# Patient Record
Sex: FEMALE | Race: WHITE | NOT HISPANIC OR LATINO | URBAN - METROPOLITAN AREA
[De-identification: names, ages, dates, MRNs, and addresses within clinical notes are randomized per-mention and may not be internally consistent; named-entity substitution may affect disease eponyms.]

---

## 2017-03-17 ENCOUNTER — HOSPITAL ENCOUNTER (OUTPATIENT)
Dept: RADIOLOGY | Facility: CLINIC | Age: 47
Discharge: HOME | End: 2017-03-17

## 2017-03-17 DIAGNOSIS — R76.11 POSITIVE PPD: ICD-10-CM

## 2020-03-01 ENCOUNTER — HEALTH MAINTENANCE LETTER (OUTPATIENT)
Age: 50
End: 2020-03-01

## 2020-12-14 ENCOUNTER — HEALTH MAINTENANCE LETTER (OUTPATIENT)
Age: 50
End: 2020-12-14

## 2021-04-18 ENCOUNTER — HEALTH MAINTENANCE LETTER (OUTPATIENT)
Age: 51
End: 2021-04-18

## 2021-05-24 ENCOUNTER — RECORDS - HEALTHEAST (OUTPATIENT)
Dept: ADMINISTRATIVE | Facility: CLINIC | Age: 51
End: 2021-05-24

## 2021-05-26 ENCOUNTER — RECORDS - HEALTHEAST (OUTPATIENT)
Dept: ADMINISTRATIVE | Facility: CLINIC | Age: 51
End: 2021-05-26

## 2021-05-27 ENCOUNTER — RECORDS - HEALTHEAST (OUTPATIENT)
Dept: ADMINISTRATIVE | Facility: CLINIC | Age: 51
End: 2021-05-27

## 2021-05-28 ENCOUNTER — RECORDS - HEALTHEAST (OUTPATIENT)
Dept: ADMINISTRATIVE | Facility: CLINIC | Age: 51
End: 2021-05-28

## 2021-05-29 ENCOUNTER — RECORDS - HEALTHEAST (OUTPATIENT)
Dept: ADMINISTRATIVE | Facility: CLINIC | Age: 51
End: 2021-05-29

## 2021-05-30 ENCOUNTER — RECORDS - HEALTHEAST (OUTPATIENT)
Dept: ADMINISTRATIVE | Facility: CLINIC | Age: 51
End: 2021-05-30

## 2021-05-31 ENCOUNTER — RECORDS - HEALTHEAST (OUTPATIENT)
Dept: ADMINISTRATIVE | Facility: CLINIC | Age: 51
End: 2021-05-31

## 2021-10-02 ENCOUNTER — HEALTH MAINTENANCE LETTER (OUTPATIENT)
Age: 51
End: 2021-10-02

## 2022-05-14 ENCOUNTER — HEALTH MAINTENANCE LETTER (OUTPATIENT)
Age: 52
End: 2022-05-14

## 2022-08-12 ENCOUNTER — OFFICE VISIT (OUTPATIENT)
Dept: SLEEP MEDICINE | Facility: CLINIC | Age: 52
End: 2022-08-12

## 2022-08-12 ENCOUNTER — LAB (OUTPATIENT)
Dept: LAB | Facility: CLINIC | Age: 52
End: 2022-08-12
Payer: COMMERCIAL

## 2022-08-12 VITALS
OXYGEN SATURATION: 98 % | WEIGHT: 137 LBS | DIASTOLIC BLOOD PRESSURE: 74 MMHG | SYSTOLIC BLOOD PRESSURE: 105 MMHG | HEART RATE: 93 BPM | HEIGHT: 67 IN | BODY MASS INDEX: 21.5 KG/M2

## 2022-08-12 DIAGNOSIS — G47.00 INSOMNIA, UNSPECIFIED TYPE: ICD-10-CM

## 2022-08-12 DIAGNOSIS — Z86.2 HISTORY OF ANEMIA: ICD-10-CM

## 2022-08-12 DIAGNOSIS — G25.81 RESTLESS LEGS SYNDROME (RLS): Primary | ICD-10-CM

## 2022-08-12 LAB
BASOPHILS # BLD AUTO: 0 10E3/UL (ref 0–0.2)
BASOPHILS NFR BLD AUTO: 0 %
EOSINOPHIL # BLD AUTO: 0.1 10E3/UL (ref 0–0.7)
EOSINOPHIL NFR BLD AUTO: 2 %
ERYTHROCYTE [DISTWIDTH] IN BLOOD BY AUTOMATED COUNT: 13.1 % (ref 10–15)
FERRITIN SERPL-MCNC: 53 NG/ML (ref 8–252)
HCT VFR BLD AUTO: 34.7 % (ref 35–47)
HGB BLD-MCNC: 12 G/DL (ref 11.7–15.7)
IMM GRANULOCYTES # BLD: 0 10E3/UL
IMM GRANULOCYTES NFR BLD: 0 %
IRON SATN MFR SERPL: 25 % (ref 15–46)
IRON SERPL-MCNC: 78 UG/DL (ref 35–180)
LYMPHOCYTES # BLD AUTO: 2.2 10E3/UL (ref 0.8–5.3)
LYMPHOCYTES NFR BLD AUTO: 37 %
MCH RBC QN AUTO: 32.2 PG (ref 26.5–33)
MCHC RBC AUTO-ENTMCNC: 34.6 G/DL (ref 31.5–36.5)
MCV RBC AUTO: 93 FL (ref 78–100)
MONOCYTES # BLD AUTO: 0.3 10E3/UL (ref 0–1.3)
MONOCYTES NFR BLD AUTO: 5 %
NEUTROPHILS # BLD AUTO: 3.3 10E3/UL (ref 1.6–8.3)
NEUTROPHILS NFR BLD AUTO: 56 %
NRBC # BLD AUTO: 0 10E3/UL
NRBC BLD AUTO-RTO: 0 /100
PLATELET # BLD AUTO: 220 10E3/UL (ref 150–450)
RBC # BLD AUTO: 3.73 10E6/UL (ref 3.8–5.2)
TIBC SERPL-MCNC: 312 UG/DL (ref 240–430)
WBC # BLD AUTO: 5.9 10E3/UL (ref 4–11)

## 2022-08-12 PROCEDURE — 36415 COLL VENOUS BLD VENIPUNCTURE: CPT

## 2022-08-12 PROCEDURE — 82728 ASSAY OF FERRITIN: CPT

## 2022-08-12 PROCEDURE — 83550 IRON BINDING TEST: CPT

## 2022-08-12 PROCEDURE — 85025 COMPLETE CBC W/AUTO DIFF WBC: CPT

## 2022-08-12 PROCEDURE — 99205 OFFICE O/P NEW HI 60 MIN: CPT | Performed by: INTERNAL MEDICINE

## 2022-08-12 RX ORDER — FOLIC ACID 1 MG/1
1000 TABLET ORAL DAILY
COMMUNITY
Start: 2022-08-06

## 2022-08-12 RX ORDER — METHOTREXATE 2.5 MG/1
TABLET ORAL
COMMUNITY
Start: 2022-08-07

## 2022-08-12 RX ORDER — LEVOTHYROXINE SODIUM 75 UG/1
75 TABLET ORAL DAILY
COMMUNITY
Start: 2022-06-06

## 2022-08-12 RX ORDER — CYCLOBENZAPRINE HCL 5 MG
TABLET ORAL
COMMUNITY
Start: 2022-08-01 | End: 2022-08-12

## 2022-08-12 RX ORDER — HYDROCODONE BITARTRATE AND ACETAMINOPHEN 10; 325 MG/1; MG/1
TABLET ORAL
COMMUNITY
Start: 2022-08-01 | End: 2024-04-15

## 2022-08-12 RX ORDER — GABAPENTIN 300 MG/1
300 CAPSULE ORAL AT BEDTIME
Qty: 45 CAPSULE | Refills: 0 | Status: SHIPPED | OUTPATIENT
Start: 2022-08-12 | End: 2022-09-15

## 2022-08-12 RX ORDER — TRAZODONE HYDROCHLORIDE 50 MG/1
TABLET, FILM COATED ORAL
COMMUNITY
Start: 2022-07-18 | End: 2022-08-12

## 2022-08-12 ASSESSMENT — SLEEP AND FATIGUE QUESTIONNAIRES
HOW LIKELY ARE YOU TO NOD OFF OR FALL ASLEEP WHILE SITTING AND TALKING TO SOMEONE: WOULD NEVER DOZE
HOW LIKELY ARE YOU TO NOD OFF OR FALL ASLEEP WHILE WATCHING TV: HIGH CHANCE OF DOZING
HOW LIKELY ARE YOU TO NOD OFF OR FALL ASLEEP WHILE SITTING QUIETLY AFTER LUNCH WITHOUT ALCOHOL: HIGH CHANCE OF DOZING
HOW LIKELY ARE YOU TO NOD OFF OR FALL ASLEEP WHILE SITTING QUIETLY AFTER LUNCH WITHOUT ALCOHOL: HIGH CHANCE OF DOZING
HOW LIKELY ARE YOU TO NOD OFF OR FALL ASLEEP WHILE SITTING INACTIVE IN A PUBLIC PLACE: SLIGHT CHANCE OF DOZING
HOW LIKELY ARE YOU TO NOD OFF OR FALL ASLEEP IN A CAR, WHILE STOPPED FOR A FEW MINUTES IN TRAFFIC: WOULD NEVER DOZE
HOW LIKELY ARE YOU TO NOD OFF OR FALL ASLEEP WHILE WATCHING TV: HIGH CHANCE OF DOZING
HOW LIKELY ARE YOU TO NOD OFF OR FALL ASLEEP WHILE LYING DOWN TO REST IN THE AFTERNOON WHEN CIRCUMSTANCES PERMIT: HIGH CHANCE OF DOZING
HOW LIKELY ARE YOU TO NOD OFF OR FALL ASLEEP WHILE SITTING AND READING: MODERATE CHANCE OF DOZING
HOW LIKELY ARE YOU TO NOD OFF OR FALL ASLEEP WHILE SITTING INACTIVE IN A PUBLIC PLACE: SLIGHT CHANCE OF DOZING
HOW LIKELY ARE YOU TO NOD OFF OR FALL ASLEEP WHEN YOU ARE A PASSENGER IN A CAR FOR AN HOUR WITHOUT A BREAK: MODERATE CHANCE OF DOZING
HOW LIKELY ARE YOU TO NOD OFF OR FALL ASLEEP WHILE SITTING AND TALKING TO SOMEONE: WOULD NEVER DOZE
HOW LIKELY ARE YOU TO NOD OFF OR FALL ASLEEP WHEN YOU ARE A PASSENGER IN A CAR FOR AN HOUR WITHOUT A BREAK: MODERATE CHANCE OF DOZING
HOW LIKELY ARE YOU TO NOD OFF OR FALL ASLEEP WHILE SITTING AND READING: MODERATE CHANCE OF DOZING
HOW LIKELY ARE YOU TO NOD OFF OR FALL ASLEEP IN A CAR, WHILE STOPPED FOR A FEW MINUTES IN TRAFFIC: WOULD NEVER DOZE
HOW LIKELY ARE YOU TO NOD OFF OR FALL ASLEEP WHILE LYING DOWN TO REST IN THE AFTERNOON WHEN CIRCUMSTANCES PERMIT: HIGH CHANCE OF DOZING

## 2022-08-12 NOTE — PROGRESS NOTES
Chief complaint: Patient is self-referred for difficulty with sleep; needs Vicodin for sleep      History of Present Illness: 51-year-old female with history of psoriatic arthritis.  She reports she has been sleeping reasonably well up until the last few years.  She has gone through menopause and had significant flare of her psoriatic arthritis as well as Lyme disease.  She had developed significant difficulties with her sleep during this period of time.  She was also prescribed Vicodin for pain which was helpful for sleep.  Her arthritis seems to be under much better control at this time on methotrexate and infliximab.  However she feels she continues to have difficulty with sleep.  She usually starts her bedtime routine around 830 or so getting into bed after 9.  She will take the Vicodin as she is getting ready for bed.  She will read in bed for a little bit and then with the vitamins able to fall asleep.  She often wakes up around 3 AM with difficulty returning to sleep.  She admits to feeling of restlessness at that time.  She also has had restlessness when she tries to cut back on the Vicodin.  Her  comes into the bedroom around 10 30-11 PM.  He has not noticed significant snoring or observed apneas.  She typically gets up around 5:45 in the morning to help gets her kids off to school.  On weekends she will sleep until 730 or 8 AM .  She typically drinks a couple coffee in the morning otherwise no more caffeine.    She looks forward to taking a nap most days of the week.  She sets an alarm for about an hour to an hour and a half.  She usually takes a nap in the early afternoon around  1-3 PM.    There is no history of nightmares, sleepwalking, sleep talking or dream enactment behavior.    She has tried various other substances to help her sleep including herbal remedies for sleep and restlessness that include melatonin, valerian, tryptophan.  She is also tried diphenhydramine.       Reviewing the chart  she has had history of anemia associated with surgeries as well as 4 pregnancies.  She is not currently taking iron.  She does take vitamin D with calcium.    Anita Sleepiness Scale   Sitting and reading: Moderate chance of dozing   Watching TV: High chance of dozing   Sitting, inactive in a public place (e.g. a theatre or a meeting): Slight chance of dozing   As a passenger in a car for an hour without a break: Moderate chance of dozing   Lying down to rest in the afternoon when circumstances permit: High chance of dozing   Sitting and talking to someone: Would never doze   Sitting quietly after a lunch without alcohol: High chance of dozing   In a car, while stopped for a few minutes in traffic: Would never doze   Total score - Anita: 14   (Less than 10 normal)    Insomnia Severity Scale  BARRY Total Score: 23  Total score categories:  0-7 = No clinically significant insomnia   8-14 = Subthreshold insomnia   15-21 = Clinical insomnia (moderate severity)  22-28 = Clinical insomnia (severe)    STOP-BANG  Loud Snore   0  Excessively Tired/Sleepy   1  Observed apnea   0  Hypertension   0  BMI> 35 kg/m2   0  Age >50   1  Neck >16 in/40cm   0  Male Gender   0  Total =   2  (0-2 low, 3-4 intermediate, 5-8 high risk of RICHIE)      Past Medical History:   Diagnosis Date     Psoriatic arthritis (H)        No Known Allergies    Current Outpatient Medications   Medication     Calcium Citrate-Vitamin D 1500-200 MG-UNIT TABS     folic acid (FOLVITE) 1 MG tablet     gabapentin (NEURONTIN) 300 MG capsule     HYDROcodone-acetaminophen (NORCO)  MG per tablet     levothyroxine (SYNTHROID/LEVOTHROID) 75 MCG tablet     methotrexate sodium 2.5 MG TABS     No current facility-administered medications for this visit.       Social History     Socioeconomic History     Marital status:      Spouse name: Not on file     Number of children: Not on file     Years of education: Not on file     Highest education level: Not on file  "  Occupational History     Not on file   Tobacco Use     Smoking status: Never Smoker     Smokeless tobacco: Never Used   Substance and Sexual Activity     Alcohol use: Yes     Alcohol/week: 7.0 standard drinks     Types: 7 Glasses of wine per week     Drug use: Not Currently     Sexual activity: Not on file   Other Topics Concern     Not on file   Social History Narrative     Not on file     Social Determinants of Health     Financial Resource Strain: Not on file   Food Insecurity: Not on file   Transportation Needs: Not on file   Physical Activity: Not on file   Stress: Not on file   Social Connections: Not on file   Intimate Partner Violence: Not on file   Housing Stability: Not on file       Family History   Problem Relation Age of Onset     Substance Abuse Maternal Half-Brother          EXAM:  /74   Pulse 93   Ht 1.702 m (5' 7\")   Wt 62.1 kg (137 lb)   SpO2 98%   BMI 21.46 kg/m   Neck 37 cm  GENERAL: Alert and no distress  EYES: Eyes grossly normal to inspection.  No discharge or erythema, or obvious scleral/conjunctival abnormalities.  Oral exam: Low drooping soft palate Mallampati 2 tonsillar stage I  RESP: No audible wheeze, cough, or visible cyanosis.  No visible retractions or increased work of breathing.  Lungs are clear to auscultation bilaterally  Cardiac tones are regular rate and rhythm  SKIN: Visible skin clear. No significant rash, abnormal pigmentation or lesions.  NEURO: Cranial nerves grossly intact.  Mentation and speech appropriate for age.  PSYCH: Mentation appears normal, affect normal/bright, judgement and insight intact, normal speech and appearance well-groomed.   Extre: No cyanosis clubbing or edema she does have deviation at the DIP joints and swollen joints hand.      TSH   Date Value Ref Range Status   10/03/2011 1.24 0.4 - 5.0 mU/L Final         ASSESSMENT:  51-year-old female with psoriatic arthritis history of Lyme disease, postmenopausal who developed significant issues " with insomnia related to pain.  Insomnia issues continue despite pain being under better control with treatment disease modifying agents.  There seems to be a component of motor restlessness that could be contributing.  She is low risk for obstructive sleep apnea.  She is a good candidate for CBT-I.    PLAN:  Sleep study is not indicated.  Recommend evaluation of anemia history and iron stores.  If ferritin and iron stores suggest low or low normal storage recommend iron with vitamin C daily.  Also recommended a trial of gabapentin 300 mg at bedtime.  Patient should cut back on the Vicodin slowly while she is taking the gabapentin.  She can go up to 600 mg of gabapentin when she is off the Vicodin if needed.  I sent her a message that she will receive in a few weeks to follow-up how medication is doing so I can rewrite the prescription.  Otherwise she will follow-up with me in 3 months.  Also referred her for formal cognitive behavioral therapy for insomnia.  Patient was instructed on the importance of taking detailed sleep logs.  She is strongly urged to cut back her daily naps to 20 to 30 minutes or less.      61 minutes spent on the date of the encounter doing chart review, history and exam, documentation and further activities per the note    Nahomy Randhawa M.D.  Pulmonary/Critical Care/Sleep Medicine    [facility]   Floor [address], [address]   [address] [place] [address]. [place] [address]   Appointments: [phone]    The above note was dictated using voice recognition software and may include typographical errors. Please contact the author for any clarifications.

## 2022-08-12 NOTE — PATIENT INSTRUCTIONS
Insomnia - Restless Leg Syndrome (RLS)  Based on the information that you provided today you are likely to have restless leg syndrome that may be interfering with your sleep.  Treatment of restless leg syndrome usually depends on how much it is bothering you.  If it is a major problem then you might want to do something about it.  Here are some treatment options that you might want to consider:       Behavior Changes:     - Reduce or eliminate caffeine and alcohol products     - Increase the amount of stretching that you do, particularly before bedtime     - Sometimes a leg message can be helpful     - Some people find that a warm bath or shower in the evening is helpful       Medications:     - Pramipexole (Mirapex)     - Ropinirole (Requip)     - Sinemet (Carbidopa / Levodopa)     - Neurontin     Possibly iron with vitamin C     Make your daytime nap shorter--set alarm for 20-30 minutes    Insomnia and Behavioral Sleep Medicine Program    [facility] provides non-drug treatment for sleep problems including:    Cognitive-behavioral Therapies for Insomnia (CBT-I)  Management of Shift-work and Jet Lag  Management of Delayed, Advanced and Irregular Circadian Rhythm Sleep Disorders  Imagery Rehearsal Therapy (IRT) for Nightmare Disorder  PAP Therapy Desensitization    You have been referred for consultation with a sleep psychologist who specializes in behavioral sleep medicine and treatment of insomnia.  [facility] offers individualized telehealth services through our [facility] and online CBT-I.    Preparing for your Consultation    You will need to keep a Sleep Diary for at least a week prior to your visit. Complete the sleep diary each day first thing after you get up by answering a few key questions about your sleep using our convenient mobile boone or paper sleep diary.  Your  answers should be based on your recall of the past 24 hours.  Avoid watching the clock or recording data during the night.     Insomnia  Daisy    The Insomnia  mobile daisy  is a convenient way to keep track of your sleep prior to your sleep consultation.  Simply download the free daisy on your Apple or Android phone and record your information each morning.  The daisy includes training, self-assessment, and sleep schedule recommendations.  Prior to your consultation we recommend you use only the sleep diary function. You can e-mail yourself a copy of your sleep diary data by going to the Settings section and using the [employer] User Data function.  During your consultation your provider will review the data with you.          Zerto Sleep Diary    You can also track your sleep using the Zerto paper sleep diary.  You can upload your sleep diary and send it via a [facility]TS message, fax it to [phone], or have it with you at the time of your consultation.            CBT-I:  Frequently Asked Questions    What is CBT-I?    Cognitive Behavioral Therapy for Insomnia, also known as CBT-I, is a highly effective non-drug treatment for insomnia. The American College of Physicians recommends CBT-I as the first treatment for chronic insomnia.  Research has shown CBT-I to be safer and more effective long term than sleeping pills.    What does CBT-I involve?     CBT-I targets behaviors that lead to chronic insomnia:  Habits that weaken the bed as a cue for sleep  Habits that weaken your body's sleep drive and sleep/wake clock   Unhelpful sleep thoughts that increase sleep-related worry and arousal.    The process involves 3-6 telehealth visits that guide you to implement proven strategies to get a better night's sleep.    People often see improvement in their sleep within a few weeks. Research shows if you keep practicing the skills you learn your sleep is likely to continue to improve 6-12 months after  treatment.    Does this program prescribe or manage sleep medication?    No.  Your prescribing provider is responsible to assist you in managing your sleep medications.  Some people choose to stop using sleep medication prior to or during CBT-I.  Our program can work with your prescribing provider to help reduce or eliminate use of sleep medications.     Getting Started Today!    If you haven't already done so, we recommend you consider making the following changes to your sleep habits prior to your sleep consultation:     Reduce your consumption of caffeine and alcohol.  Both can disrupt sleep and make strengthening your sleep more difficult.  Specifically:    - Avoid caffeine within 6 hours of bedtime   - No more than 3 caffeinated beverages per day (e.g. 8 oz. cup coffee or 12 oz. cup soda)            - No alcohol within 3 hours of bedtime    Make sure your bedroom is quiet, comfortable and dark.  Noise, light and an uncomfortable sleep space can harm your sleep.      Keep the same sleep schedule 7 days a week.unless you do shift work.      Online CBT-I     If you want to get started today, research indicates that online CBT-I can be effective for some individuals. These programs requires comfort with boone-based or online learning.  However, digital CBT-I programs are not for everyone.  Contraindications include:    Seizure disorders,   Bipolar disorder,   Unstable medical or mental health conditions,   Frailty or risk of falling  Pregnancy    You should consult a sleep specialist before using these resources if you have:    Sleep Apnea  Restless Leg Syndrome  Sleep Walking  REM behavior disorder  Night Terrors  Excessive Daytime Sleepiness  Are engaged in shift work  Use prescription sleep medication    Our Online CBT-I program    If your sleep provider recommends online CBT-I for you , the cost for an entire 6-week program is $40.    To get started, copy and paste the link below which will take you to the  landing page to register:                           [email or web address]               If you wish to complete the online CBT-I program but do not plan to follow-up with a sleep provider, you are set to begin the program.    If you are planning to work with an [facility] sleep provider, there are a couple of extra steps you can take to share your sleep data with your sleep provider.  To share sleep log data, go to the left side navigation and click on the  share sleep log  button:         You will be taken to the page below where you will enter  the provider code [employer] into the box.          Once you press the locate button, the information for  [place] will pop up as below.  By pressing the Submit button your data will be sent to our  secure [facility] sleep program portal for review by your sleep provider. You will only need to do this step once.                                  Self-help Workbooks for Insomnia    If you have found self-help books useful in the past, you may want to consider reading one of the following books prior to your consultation:    Say Delia to Insomnia: The Six-Week, Drug-Free Program Developed at [facility] [employer].  Jaspreet Ryan MD. Available in paperback, Zbigniew, and audiobook.    Overcoming Insomnia: A Cognitive-Behavioral Therapy Approach, Workbook.  Aren Morgan, PhD  and Danisha Gold, PhD.  Available in paperback and Zbigniew.    Quiet Your Mind and Get to Sleep: Solutions to Insomnia for Those with Depression, Anxiety, or Chronic Pain.  Ness Dobbins, PhD and Danisha Gold, PhD.  Available in paperback and Zbigniew         Your BMI is Body mass index is 21.46 kg/m .    What is BMI?  Body mass index (BMI) is one way to tell whether you are at a healthy weight, overweight, or obese. It measures your weight in relation to your height.  A BMI of 18.5 to 24.9 is in the healthy range. A person with a BMI of 25 to 29.9 is considered overweight, and  someone with a BMI of 30 or greater is considered obese.  Another way to find out if you are at risk for health problems caused by overweight and obesity is to measure your waist. If you are a woman and your waist is more than 35 inches, or if you are a man and your waist is more than 40 inches, your risk of disease may be higher.  More than two-thirds of American adults are considered overweight or obese. Being overweight or obese increases the risk for further weight gain.  Excess weight may lead to heart disease and diabetes. Creating and following plans for healthy eating and physical activity may help you improve your health.    Methods for maintaining or losing weight.  Weight control is part of healthy lifestyle and includes exercise, emotional health, and healthy eating habits.  Careful eating habits lifelong is the mainstay of weight control.  Though there are significant health benefits from weight loss, long-term weight loss with diet alone may be very difficult to achieve- studies show long-term success with dietary management in less than 10% of people. Attaining a healthy weight may be especially difficult to achieve in those with severe obesity. In some cases, medications, devices and surgical management might be considered.    What can you do?  If you are overweight or obese and are interested in methods for weight loss, you should discuss this with your provider. In addition, we recommend that you review healthy life styles and methods for weight loss available through the National Institutes of Health patient information sites:   http://win.niddk.nih.gov/publications/index.htm

## 2022-08-12 NOTE — NURSING NOTE
CBTI scheduled along w. Sleep diary info sent via [employer]. 3 month f.u scheduled.    August Suggs, Visit facilitator

## 2022-09-03 ENCOUNTER — HEALTH MAINTENANCE LETTER (OUTPATIENT)
Age: 52
End: 2022-09-03

## 2022-09-15 ENCOUNTER — TELEPHONE (OUTPATIENT)
Dept: SLEEP MEDICINE | Facility: CLINIC | Age: 52
End: 2022-09-15

## 2022-09-15 RX ORDER — GABAPENTIN 300 MG/1
CAPSULE ORAL
Qty: 120 CAPSULE | Refills: 1 | Status: SHIPPED | OUTPATIENT
Start: 2022-09-15 | End: 2024-02-14

## 2022-09-15 NOTE — TELEPHONE ENCOUNTER
Patient was called and notified of the following recommendation from Dr. Randhawa.     Wrote prescription to increase to 3 capsules at bedtime, after 2 weeks can increase to 4.  Will change the size of the capsules once dosing stabilizes.   She needs to keep her appointment with Dr. Aguilar.  Encouraged keeping detailed diary in the weeks prior.   KARIN Watson RN on 9/15/2022 at 1:58 PM

## 2022-09-15 NOTE — TELEPHONE ENCOUNTER
Patient called back letting us know she is taking 600mg without relief. Patient reports some improvement with RLS but is having trouble sleeping.     Patient reports averaging 3-4 hours of sleep per night, awakening intermittently during that time.     Due for Gabapentin refill. Unsure if different medication should be prescribed or not.   Last filled: 08/13/2022 quantity 45 with 0 refills    Will route to provider to advise and fill Gabapentin or move forward with alternate medication.     Susana Watson RN on 9/15/2022 at 11:50 AM

## 2022-09-15 NOTE — TELEPHONE ENCOUNTER
Message from MA was sent yesterday evening regarding medications questions the patient had.     Call placed to patient to discuss. No answer. Detailed message left for patient to call back as able.     Susana Watson RN on 9/15/2022 at 7:57 AM

## 2022-09-21 ASSESSMENT — SLEEP AND FATIGUE QUESTIONNAIRES
HOW LIKELY ARE YOU TO NOD OFF OR FALL ASLEEP WHILE SITTING AND READING: MODERATE CHANCE OF DOZING
HOW LIKELY ARE YOU TO NOD OFF OR FALL ASLEEP WHILE SITTING INACTIVE IN A PUBLIC PLACE: SLIGHT CHANCE OF DOZING
HOW LIKELY ARE YOU TO NOD OFF OR FALL ASLEEP WHILE SITTING QUIETLY AFTER LUNCH WITHOUT ALCOHOL: MODERATE CHANCE OF DOZING
HOW LIKELY ARE YOU TO NOD OFF OR FALL ASLEEP WHILE SITTING AND TALKING TO SOMEONE: SLIGHT CHANCE OF DOZING
HOW LIKELY ARE YOU TO NOD OFF OR FALL ASLEEP WHEN YOU ARE A PASSENGER IN A CAR FOR AN HOUR WITHOUT A BREAK: SLIGHT CHANCE OF DOZING
HOW LIKELY ARE YOU TO NOD OFF OR FALL ASLEEP WHILE LYING DOWN TO REST IN THE AFTERNOON WHEN CIRCUMSTANCES PERMIT: MODERATE CHANCE OF DOZING
HOW LIKELY ARE YOU TO NOD OFF OR FALL ASLEEP IN A CAR, WHILE STOPPED FOR A FEW MINUTES IN TRAFFIC: WOULD NEVER DOZE
HOW LIKELY ARE YOU TO NOD OFF OR FALL ASLEEP WHILE WATCHING TV: MODERATE CHANCE OF DOZING

## 2022-09-22 ENCOUNTER — OFFICE VISIT (OUTPATIENT)
Dept: SLEEP MEDICINE | Facility: CLINIC | Age: 52
End: 2022-09-22
Payer: COMMERCIAL

## 2022-09-22 VITALS
SYSTOLIC BLOOD PRESSURE: 118 MMHG | BODY MASS INDEX: 22.98 KG/M2 | HEART RATE: 98 BPM | DIASTOLIC BLOOD PRESSURE: 79 MMHG | HEIGHT: 66 IN | OXYGEN SATURATION: 99 % | WEIGHT: 143 LBS

## 2022-09-22 DIAGNOSIS — G25.81 RESTLESS LEGS SYNDROME (RLS): ICD-10-CM

## 2022-09-22 DIAGNOSIS — G47.00 INSOMNIA, UNSPECIFIED TYPE: ICD-10-CM

## 2022-09-22 DIAGNOSIS — G47.10 HYPERSOMNIA: Primary | ICD-10-CM

## 2022-09-22 DIAGNOSIS — Z98.890 HISTORY OF MANDIBULAR SURGERY: ICD-10-CM

## 2022-09-22 PROCEDURE — 99215 OFFICE O/P EST HI 40 MIN: CPT | Performed by: INTERNAL MEDICINE

## 2022-09-22 RX ORDER — CYCLOBENZAPRINE HCL 5 MG
TABLET ORAL
COMMUNITY
Start: 2022-09-20 | End: 2024-02-14

## 2022-09-22 RX ORDER — PROGESTERONE 100 MG/1
100 CAPSULE ORAL AT BEDTIME
COMMUNITY
Start: 2022-09-04 | End: 2024-02-14

## 2022-09-22 NOTE — PROGRESS NOTES
Chief complaint: Follow-up for sleep, daytime sleepiness    History of Present Illness: 52-year-old female with history of severe psoriatic arthritis and pain.  Pain was significantly affecting her sleep, she was prescribed Vicodin which was helpful for pain and sleep.  Once she started immunotherapy pain improved. However, sleep issues persisted. She continues to feel that she cannot sleep well without Vicodin.  Please see her initial consult note for details.  The sleep issues appear to be consistent with a motor restlessness.  They impact her mostly around 1-3 AM when she is trying to fall back asleep.  She usually falls asleep after 9 PM.  Currently she is taking gabapentin with recent increase to 900 mg at bedtime.  She feels that it does help her fall asleep however she continues to wake up and have difficulty returning to sleep with a sense of restlessness.  She does feel drowsy however at that time.  She continues to get up in the morning to get her kids off to school.  She is not currently taking iron therapy.  There is been no history of snoring or observed apneas.  However patient does have a history of mandible surgery and has a slightly recessed jaw.  She wears sleep tape over her mouth to keep her mouth closed and keep her mouth from getting overly dry.  She does take diphenhydramine sometimes in the middle the night to help her fall back asleep.  She is not on any antidepressants.  She does have an appointment scheduled with the insomnia team in a couple of months.      Magnolia Sleepiness Scale  Total score - Magnolia: 11 (9/21/2022  9:58 PM)   (Less than 10 normal)    Insomnia Severity Scale  BARRY Total Score: 19  (normal 0-7, mild 8-14, moderate 15-21, severe 22-28)    Past Medical History:   Diagnosis Date     Psoriatic arthritis (H)        No Known Allergies    Current Outpatient Medications   Medication     Calcium Citrate-Vitamin D 1500-200 MG-UNIT TABS     folic acid (FOLVITE) 1 MG tablet      gabapentin (NEURONTIN) 300 MG capsule     HYDROcodone-acetaminophen (NORCO)  MG per tablet     levothyroxine (SYNTHROID/LEVOTHROID) 75 MCG tablet     methotrexate sodium 2.5 MG TABS     No current facility-administered medications for this visit.       Social History     Socioeconomic History     Marital status:      Spouse name: Not on file     Number of children: Not on file     Years of education: Not on file     Highest education level: Not on file   Occupational History     Not on file   Tobacco Use     Smoking status: Never Smoker     Smokeless tobacco: Never Used   Substance and Sexual Activity     Alcohol use: Yes     Alcohol/week: 7.0 standard drinks     Types: 7 Glasses of wine per week     Drug use: Not Currently     Sexual activity: Not on file   Other Topics Concern     Not on file   Social History Narrative     Not on file     Social Determinants of Health     Financial Resource Strain: Not on file   Food Insecurity: Not on file   Transportation Needs: Not on file   Physical Activity: Not on file   Stress: Not on file   Social Connections: Not on file   Intimate Partner Violence: Not on file   Housing Stability: Not on file       Family History   Problem Relation Age of Onset     Substance Abuse Maternal Half-Brother            EXAM:  There were no vitals taken for this visit.  GENERAL: Alert   EYES: Eyes grossly normal to inspection.  No discharge or erythema, or obvious scleral/conjunctival abnormalities.  Slight overjet with recessed lower mandible  RESP: No audible wheeze, cough, or visible cyanosis.  No visible retractions or increased work of breathing.    SKIN: Visible skin clear. No significant rash, abnormal pigmentation or lesions.  NEURO: Cranial nerves grossly intact.  Mentation and speech appropriate for age.  PSYCH: Mentation appears normal, becomes tearful when discussing her sleep frustrations, judgement and insight intact, normal speech     Ferritin 53 (goal  >75)    ASSESSMENT:  52-year-old female with severe psoriatic arthritis, improved pain on immune therapy, feels she cannot sleep without opiates.  Opiates may be treating a component of motor restlessness that persists despite improved disease control with immunotherapy.  She has some psychophysiologic insomnia associated with her history.  Getting some benefit potentially with gabapentin.  But continues to suffer in the middle of the night.    PLAN:  Given that ferritin is below 75 recommended iron and vitamin C therapy every other day.  We will split gabapentin dosing to 600/600 to optimize absorption as well as optimize timing for symptoms that are worse in the middle the night.  She will take 600 at bedtime and 600 few hours later.  Sometimes patients need dosing as high as 1800 and higher.  Could consider switching to gabapentin encarbil for its delayed peak onset but does not appear to be covered by her insurance.  Alternatively in the future could consider using as needed Mirapex in the middle of the night..  Avoid any medications that could be aggravating motor restlessness.  Patient does take diphenhydramine on occasion.  She should discontinue that.  And will rule out other sleep disorders beginning with sleep disordered breathing even though she is low risk.  Ordered home sleep apnea test.    41 minutes spent on the date of the encounter doing chart review, history and exam, documentation and further activities per the note    Nahomy Randhawa M.D.  Pulmonary/Critical Care/Sleep Medicine    [facility]   Floor 1, [address]   [address] [place] [address]. [place] [address]   Appointments: [phone]    The above note was dictated using voice recognition software and may include typographical errors. Please contact the author for any clarifications.

## 2022-09-22 NOTE — NURSING NOTE
"  HST  Instructions:    [facility]   [address] [facility] [address]  [facility], [address]     PLEASE NOTE: WE ARE UNABLE TO DO REDUCED PARKING AT THIS TIME FOR RED RAMP. THERE IS STREET PARKING ACROSS FROM RAMP     Information:     *Park in the Red Ramp- This visit will be 15 minutes or less    *You will  then enter the building via the [address] and come down to 1st floor, where the sleep lab is    located in [address] (if you do not enter via [address] you will need to walk to front of building as all other doors are locked).     *Please ring door bell and our sleep technicians will answer and instruct you on placement of the Home Sleep Study device.    Drop Off Information:    Please arrive in the front of the \"[facility]\" and call [phone] when returning your Home Sleep Study device and our technicians will come out to your car to .                                        "

## 2022-09-22 NOTE — PATIENT INSTRUCTIONS
Take oral iron with vitamin C every other day  Take 600 mg of gabapentin at bedtime then take another 600 mg 2-4 hour later if awaken with restlessness  Stop taking diphenhydramine (benadryl)    Your BMI is Body mass index is 23.08 kg/m .    What is BMI?  Body mass index (BMI) is one way to tell whether you are at a healthy weight, overweight, or obese. It measures your weight in relation to your height.  A BMI of 18.5 to 24.9 is in the healthy range. A person with a BMI of 25 to 29.9 is considered overweight, and someone with a BMI of 30 or greater is considered obese.  Another way to find out if you are at risk for health problems caused by overweight and obesity is to measure your waist. If you are a woman and your waist is more than 35 inches, or if you are a man and your waist is more than 40 inches, your risk of disease may be higher.  More than two-thirds of American adults are considered overweight or obese. Being overweight or obese increases the risk for further weight gain.  Excess weight may lead to heart disease and diabetes. Creating and following plans for healthy eating and physical activity may help you improve your health.    Methods for maintaining or losing weight.  Weight control is part of healthy lifestyle and includes exercise, emotional health, and healthy eating habits.  Careful eating habits lifelong is the mainstay of weight control.  Though there are significant health benefits from weight loss, long-term weight loss with diet alone may be very difficult to achieve- studies show long-term success with dietary management in less than 10% of people. Attaining a healthy weight may be especially difficult to achieve in those with severe obesity. In some cases, medications, devices and surgical management might be considered.    What can you do?  If you are overweight or obese and are interested in methods for weight loss, you should discuss this with your provider. In addition, we recommend  that you review healthy life styles and methods for weight loss available through the National Institutes of Health patient information sites:   http://win.niddk.nih.gov/publications/index.htm

## 2022-11-07 RX ORDER — GABAPENTIN 300 MG/1
CAPSULE ORAL
Qty: 120 CAPSULE | Refills: 1 | Status: CANCELLED | OUTPATIENT
Start: 2022-11-07

## 2022-11-07 NOTE — TELEPHONE ENCOUNTER
Pending Prescriptions:                       Disp   Refills    gabapentin (NEURONTIN) 300 MG capsule     120 ca*1            Sig: Three capsules at bedtime. After two weeks can           increased to 4.    Last Written Prescription Date:  9/15/2022  Last Fill Quantity: 120,   # refills: 1  Last Office Visit with G, P or [facility] prescribing provider: 9/22/2022  Future Office visit:   Pt has cancelled HST, CBTI, and follow up with Dr. Randhawa.      VERENICE Landry

## 2022-11-14 NOTE — TELEPHONE ENCOUNTER
Called and left message for patient to call back and clarify if she is taking 900mg or 1200mg at bedtime.     Alina Reilly CMA on 11/14/2022 at 3:23 PM

## 2023-06-03 ENCOUNTER — HEALTH MAINTENANCE LETTER (OUTPATIENT)
Age: 53
End: 2023-06-03

## 2023-09-18 ENCOUNTER — APPOINTMENT (OUTPATIENT)
Dept: MRI IMAGING | Facility: HOSPITAL | Age: 53
End: 2023-09-18
Payer: COMMERCIAL

## 2023-09-18 ENCOUNTER — HOSPITAL ENCOUNTER (EMERGENCY)
Facility: HOSPITAL | Age: 53
Discharge: HOME | End: 2023-09-18
Payer: COMMERCIAL

## 2023-09-18 VITALS
WEIGHT: 132 LBS | SYSTOLIC BLOOD PRESSURE: 135 MMHG | RESPIRATION RATE: 18 BRPM | BODY MASS INDEX: 20.72 KG/M2 | DIASTOLIC BLOOD PRESSURE: 81 MMHG | OXYGEN SATURATION: 100 % | HEART RATE: 89 BPM | TEMPERATURE: 98.2 F | HEIGHT: 67 IN

## 2023-09-18 DIAGNOSIS — G44.019 EPISODIC CLUSTER HEADACHE, NOT INTRACTABLE: ICD-10-CM

## 2023-09-18 LAB
ANION GAP SERPL CALCULATED.3IONS-SCNC: 11 MMOL/L (ref 7–15)
BASOPHILS # BLD AUTO: 0 10E3/UL (ref 0–0.2)
BASOPHILS NFR BLD AUTO: 1 %
BUN SERPL-MCNC: 14 MG/DL (ref 6–20)
CALCIUM SERPL-MCNC: 9.4 MG/DL (ref 8.6–10)
CHLORIDE SERPL-SCNC: 105 MMOL/L (ref 98–107)
CREAT SERPL-MCNC: 0.73 MG/DL (ref 0.51–0.95)
DEPRECATED HCO3 PLAS-SCNC: 24 MMOL/L (ref 22–29)
EGFRCR SERPLBLD CKD-EPI 2021: >90 ML/MIN/1.73M2
EOSINOPHIL # BLD AUTO: 0.1 10E3/UL (ref 0–0.7)
EOSINOPHIL NFR BLD AUTO: 1 %
ERYTHROCYTE [DISTWIDTH] IN BLOOD BY AUTOMATED COUNT: 12.6 % (ref 10–15)
GLUCOSE SERPL-MCNC: 95 MG/DL (ref 70–99)
HCT VFR BLD AUTO: 38.8 % (ref 35–47)
HGB BLD-MCNC: 13.4 G/DL (ref 11.7–15.7)
HOLD SPECIMEN: NORMAL
IMM GRANULOCYTES # BLD: 0 10E3/UL
IMM GRANULOCYTES NFR BLD: 0 %
LYMPHOCYTES # BLD AUTO: 2 10E3/UL (ref 0.8–5.3)
LYMPHOCYTES NFR BLD AUTO: 35 %
MCH RBC QN AUTO: 32.4 PG (ref 26.5–33)
MCHC RBC AUTO-ENTMCNC: 34.5 G/DL (ref 31.5–36.5)
MCV RBC AUTO: 94 FL (ref 78–100)
MONOCYTES # BLD AUTO: 0.3 10E3/UL (ref 0–1.3)
MONOCYTES NFR BLD AUTO: 5 %
NEUTROPHILS # BLD AUTO: 3.3 10E3/UL (ref 1.6–8.3)
NEUTROPHILS NFR BLD AUTO: 58 %
NRBC # BLD AUTO: 0 10E3/UL
NRBC BLD AUTO-RTO: 0 /100
PLATELET # BLD AUTO: 249 10E3/UL (ref 150–450)
POTASSIUM SERPL-SCNC: 4.4 MMOL/L (ref 3.4–5.3)
RBC # BLD AUTO: 4.13 10E6/UL (ref 3.8–5.2)
SODIUM SERPL-SCNC: 140 MMOL/L (ref 136–145)
WBC # BLD AUTO: 5.7 10E3/UL (ref 4–11)

## 2023-09-18 PROCEDURE — 255N000002 HC RX 255 OP 636: Mod: JZ

## 2023-09-18 PROCEDURE — A9585 GADOBUTROL INJECTION: HCPCS | Mod: JZ

## 2023-09-18 PROCEDURE — 36415 COLL VENOUS BLD VENIPUNCTURE: CPT

## 2023-09-18 PROCEDURE — 70553 MRI BRAIN STEM W/O & W/DYE: CPT

## 2023-09-18 PROCEDURE — 99285 EMERGENCY DEPT VISIT HI MDM: CPT | Mod: 25

## 2023-09-18 PROCEDURE — 85025 COMPLETE CBC W/AUTO DIFF WBC: CPT

## 2023-09-18 PROCEDURE — 82310 ASSAY OF CALCIUM: CPT

## 2023-09-18 RX ORDER — TETRACAINE HYDROCHLORIDE 5 MG/ML
1 SOLUTION OPHTHALMIC ONCE
Status: COMPLETED | OUTPATIENT
Start: 2023-09-18 | End: 2023-09-18

## 2023-09-18 RX ORDER — GADOBUTROL 604.72 MG/ML
6 INJECTION INTRAVENOUS ONCE
Status: COMPLETED | OUTPATIENT
Start: 2023-09-18 | End: 2023-09-18

## 2023-09-18 RX ADMIN — GADOBUTROL 6 ML: 604.72 INJECTION INTRAVENOUS at 13:38

## 2023-09-18 ASSESSMENT — ENCOUNTER SYMPTOMS
PHOTOPHOBIA: 0
CHILLS: 0
SEIZURES: 0
FEVER: 0
EYE PAIN: 1
SORE THROAT: 0
CONFUSION: 0
HEADACHES: 1
SHORTNESS OF BREATH: 0
NUMBNESS: 0
WEAKNESS: 0
EYE ITCHING: 0
EYE REDNESS: 1
RHINORRHEA: 1
EYE DISCHARGE: 0
SPEECH DIFFICULTY: 0
COUGH: 0

## 2023-09-18 ASSESSMENT — VISUAL ACUITY
OS: 20/70
OU: 20/50
OD: 20/70
OU: NORMAL

## 2023-09-18 ASSESSMENT — ACTIVITIES OF DAILY LIVING (ADL)
ADLS_ACUITY_SCORE: 33
ADLS_ACUITY_SCORE: 35
ADLS_ACUITY_SCORE: 35

## 2023-09-18 NOTE — DISCHARGE INSTRUCTIONS
You were seen at the Emergency Department today for eye pain/tearing and headache.  You had a thorough workup that included a physical exam and a MRI Head.    Your symptoms sound consistent with cluster headache as discussed. I recommend rest, staying well hydrated, ibuprofen, Tylenol or Excedrin for recurrent symptoms, cool or warm compresses (whichever feel better to you).     If your symptoms are difficult to control despite these above recommendations, you can follow up with your primary care provider to discuss prophylactic medications.    Ibuprofen/Naproxen Discharge Instructions:  You may take ibuprofen for pain control.  The maximum dose of (ibuprofen is 3200 mg ) in a 24-hour period.    Take this medication with food to prevent stomach irritation.  With long-term use this medication can irritate the stomach causing pain and lead to development of a stomach ulcer.  If you notice stomach pain or vomiting of coffee-ground colored vomit or blood, please be seen by a healthcare provider.  Attempt to use this medication for the shortest time possible.      Tylenol (Acetaminophen) Discharge Instructions:  You may take 2 tablets of regular strength, over-the-counter, Tylenol (acetaminophen) every 4-6 hours as needed for pain.  Take no more than 4000 mg of Tylenol in a 24-hour period.      Avoid taking more than 1 acetaminophen-containing product at a time and be aware that many over-the-counter medications contain a combination of acetaminophen and other products.  If you are taking Tylenol in addition to a combination product please keep track of your daily acetaminophen dose to make sure you do not exceed the recommended 4000 mg.  Taking too much acetaminophen can cause permanent damage to your liver.    Please follow up with your primary care provider for reevaluation and ongoing management.    Return to the ER if you develop any worsening headache, confusion, vomiting, dizziness, weakness/numbness/tingling of  arms or legs, facial droop, speech difficulty, or other new symptoms.    Take Care!  - Zulay Barlow PA-C

## 2023-09-18 NOTE — ED PROVIDER NOTES
"EMERGENCY DEPARTMENT ENCOUNTER      NAME: Ivette Carmona  AGE: 53 year old female  YOB: 1970  MRN: [id number]  EVALUATION DATE & TIME: 2023 10:57 AM    PCP: Luz Montanez    ED PROVIDER: Zulay Barlow PA-C      Chief Complaint   Patient presents with    Eye Problem         FINAL IMPRESSION:  1. Episodic cluster headache, not intractable          ED COURSE & MEDICAL DECISION MAKIN:29 AM Met with patient for initial interview. Plan for care discussed.  2:45 PM Reviewed MRI results with ED MD who recommends further consultation with neurology. Neurology paged.  2:55 PM Spoke with neurology who advises MRI results is something patient was likely born with. He reports symptomatic management for headaches.  3:00 PM Reevaluated and updated patient. I discussed the plan for discharge with the patient, and patient is agreeable. We discussed supportive cares at home and reasons for return to the ER including new or worsening symptoms. All questions and concerns addressed. Patient to be discharged by RN.    53 year old female presents to the Emergency Department for evaluation of episodic right eye pain with redness, tearing, rhinorrhea, and headache. Symptoms have since resolved upon my initial evaluation with resolution of headache, eye pain/redness/tearing, and rhinorrhea. No preceding trauma or injury. Upon exam, patient is afebrile, mildly hypertensive, and in no acute distress. Patient answers questions appropriately and exhibits no acute focal neurological deficits. Symptoms sound like classic cluster headache, but given she has been seen \"many times\" by PCP without previous imaging of the head, will obtain MRI to rule out new mass/lesion/MS. Based on patient's presenting symptoms, laboratories and imaging were ordered. Given resolution of symptoms and no preceding trauma, using shared decision making Wood's lamp deferred.    CBC without leukocytosis or anemia. BMP WNL. MRI without " acute infarct, mass, or hemorrhage, but did show slight focal dilation of right lateral ventricle which could reflex remote injury vs developmental variation. Discussed findings with neurology who advises this is likely developmental variation and no additional imaging or work-up is recommended. He recommends headache management and outpatient follow up with PCP.     Patient declined analgesia upon initial evaluation as symptoms resolved. Symptoms and workup most consistent with cluster headache. Patient was made aware of the above findings. Plan to discharge patient home with symptomatic management, strict return precautions, and close follow up with their PCP for reevaluation and ongoing management. The patient was stable and well appearing upon discharge. The patient was advised to return to the ER if any new or worsening symptoms develop. The patient verbalizes understanding and agrees with the plan.     Medical Decision Making    History:  Supplemental history from: Documented in chart, if applicable  External Record(s) reviewed: Documented in chart, if applicable.    Work Up:  Chart documentation includes differential considered and any EKGs or imaging independently interpreted by provider, where specified.  In additional to work up documented, I considered the following work up: Documented in chart, if applicable.    External consultation:  Discussion of management with another provider: Documented in chart, if applicable    Complicating factors:  Care impacted by chronic illness: N/A  Care affected by social determinants of health: N/A    Disposition considerations: Discharge. No recommendations on prescription strength medication(s). See documentation for any additional details.        MEDICATIONS GIVEN IN THE EMERGENCY:  Medications   tetracaine (PONTOCAINE) 0.5 % ophthalmic solution 1 drop (1 drop Both Eyes Not Given 9/18/23 1424)   fluorescein (FUL-CHRISTIANO) ophthalmic strip 1 strip (1 strip Both Eyes Not  Given 9/18/23 2404)   gadobutrol (GADAVIST) injection 6 mL (6 mLs Intravenous $Given 9/18/23 133)       NEW PRESCRIPTIONS STARTED AT TODAY'S ER VISIT  New Prescriptions    No medications on file          =================================================================    HPI    Patient information was obtained from: patient    Use of : N/A      Ivette Carmona is a 53 year old female who presents to this ED for evaluation of left eye pain and redness.  Patient reports episodic right eye pain, redness, tearing, runny nose, headache that lasts approximately 2 hours.  She experiences these episodes about 3-4 times per week.  She reports symptoms have been ongoing for the last 6 months.  She uses a heating pad over the eye with some relief.  She has tried artificial tears without improvement.  She has seen her primary care provider multiple times without improvement.  She never followed up with ophthalmology as recommended.  She denies any fevers, chills, eye crusting/purulent drainage, itching, vision changes or peripheral vision changes.  She wears glasses, but does not wear contacts.  She denies any ocular injuries.  She denies any new numbness, tingling, weakness in her arms or legs, speech difficulty, gait trouble, chest pain, shortness of breath, or any other concerning symptoms.  She denies history of  MS or other neuromuscular degenerative diseases.  She reports an episode prior to arrival, but symptoms have since completely resolved.      REVIEW OF SYSTEMS   Review of Systems   Constitutional:  Negative for chills and fever.   HENT:  Positive for rhinorrhea. Negative for congestion and sore throat.    Eyes:  Positive for pain and redness. Negative for photophobia, discharge, itching and visual disturbance.   Respiratory:  Negative for cough and shortness of breath.    Cardiovascular:  Negative for chest pain.   Neurological:  Positive for headaches. Negative for seizures, syncope, speech difficulty,  "weakness and numbness.   Psychiatric/Behavioral:  Negative for confusion.      PAST MEDICAL HISTORY:  Past Medical History:   Diagnosis Date    Psoriatic arthritis (H)        PAST SURGICAL HISTORY:  No past surgical history on file.        CURRENT MEDICATIONS:    Calcium Citrate-Vitamin D 1500-200 MG-UNIT TABS  cyclobenzaprine (FLEXERIL) 5 MG tablet  folic acid (FOLVITE) 1 MG tablet  gabapentin (NEURONTIN) 300 MG capsule  HYDROcodone-acetaminophen (NORCO)  MG per tablet  levothyroxine (SYNTHROID/LEVOTHROID) 75 MCG tablet  methotrexate sodium 2.5 MG TABS  naloxone (NARCAN) 4 MG/0.1ML nasal spray  progesterone (PROMETRIUM) 100 MG capsule        ALLERGIES:  No Known Allergies    FAMILY HISTORY:  Family History   Problem Relation Age of Onset    Substance Abuse Maternal Half-Brother        SOCIAL HISTORY:   Social History     Socioeconomic History    Marital status:    Tobacco Use    Smoking status: Never    Smokeless tobacco: Never   Substance and Sexual Activity    Alcohol use: Yes     Alcohol/week: 7.0 standard drinks of alcohol     Types: 7 Glasses of wine per week    Drug use: Not Currently       VITALS:  BP (!) 140/85   Pulse 92   Temp 98.2  F (36.8  C)   Resp 20   Ht 1.702 m (5' 7\")   Wt 59.9 kg (132 lb)   SpO2 100%   BMI 20.67 kg/m      PHYSICAL EXAM    Constitutional:  Alert, in no acute distress. Cooperative.  EYES: Conjunctivae clear. No periorbital erythema, warmth, swelling, purulence, fluctuance, chalazion or hordeolum. PERRL. EOM intact. Peripheral fields intact.  HENT:  Atraumatic, normocephalic. Oropharynx clear. Moist membranes. Tongue without deviation.  Respiratory:  Respirations even, unlabored, in no acute respiratory distress. No cough. Speaks in full sentences easily.  Cardiovascular:  Regular rate, good peripheral perfusion.   GI: Soft, flat, non-distended.  Musculoskeletal:  No edema. No cyanosis. Range of motion major extremities intact.    Integument: Warm, Dry. No rash to " visualized skin.   Neurologic:  Alert & oriented x4. No focal deficits noted. GCS 15. Sensation intact. Motor intact. Coordination intact. 5/5 strength.   Psych: Normal mood and affect.      LAB:  All pertinent labs reviewed and interpreted.  Results for orders placed or performed during the hospital encounter of 09/18/23   MR Brain w/o & w Contrast    Impression    IMPRESSION:  1.  Unremarkable brain MRI. No acute infarct, mass, or hemorrhage.  2.  Slight focal asymmetric dilation along the posterior-superior margin of the right lateral ventricle with mild surrounding T2 FLAIR hyperintensity. This could reflect remote injury or developmental variation.                     Basic metabolic panel   Result Value Ref Range    Sodium 140 136 - 145 mmol/L    Potassium 4.4 3.4 - 5.3 mmol/L    Chloride 105 98 - 107 mmol/L    Carbon Dioxide (CO2) 24 22 - 29 mmol/L    Anion Gap 11 7 - 15 mmol/L    Urea Nitrogen 14.0 6.0 - 20.0 mg/dL    Creatinine 0.73 0.51 - 0.95 mg/dL    Calcium 9.4 8.6 - 10.0 mg/dL    Glucose 95 70 - 99 mg/dL    GFR Estimate >90 >60 mL/min/1.73m2   CBC with platelets and differential   Result Value Ref Range    WBC Count 5.7 4.0 - 11.0 10e3/uL    RBC Count 4.13 3.80 - 5.20 10e6/uL    Hemoglobin 13.4 11.7 - 15.7 g/dL    Hematocrit 38.8 35.0 - 47.0 %    MCV 94 78 - 100 fL    MCH 32.4 26.5 - 33.0 pg    MCHC 34.5 31.5 - 36.5 g/dL    RDW 12.6 10.0 - 15.0 %    Platelet Count 249 150 - 450 10e3/uL    % Neutrophils 58 %    % Lymphocytes 35 %    % Monocytes 5 %    % Eosinophils 1 %    % Basophils 1 %    % Immature Granulocytes 0 %    NRBCs per 100 WBC 0 <1 /100    Absolute Neutrophils 3.3 1.6 - 8.3 10e3/uL    Absolute Lymphocytes 2.0 0.8 - 5.3 10e3/uL    Absolute Monocytes 0.3 0.0 - 1.3 10e3/uL    Absolute Eosinophils 0.1 0.0 - 0.7 10e3/uL    Absolute Basophils 0.0 0.0 - 0.2 10e3/uL    Absolute Immature Granulocytes 0.0 <=0.4 10e3/uL    Absolute NRBCs 0.0 10e3/uL   Extra Green Top (Lithium Heparin) Tube   Result  Value Ref Range    Hold Specimen JIC        RADIOLOGY:  Reviewed all pertinent imaging. Please see official radiology report.  MR Brain w/o & w Contrast   Final Result   IMPRESSION:   1.  Unremarkable brain MRI. No acute infarct, mass, or hemorrhage.   2.  Slight focal asymmetric dilation along the posterior-superior margin of the right lateral ventricle with mild surrounding T2 FLAIR hyperintensity. This could reflect remote injury or developmental variation.                                Zulay Barlow PA-C  [facility] EMERGENCY DEPARTMENT  [facility] [facility] [id number] [phone]      Zulay Barlow PA-C  09/18/23 1527

## 2023-09-18 NOTE — ED TRIAGE NOTES
"Patient states left eye pain with redness that started this am, \"I have had similar episodes many times over the last 6 mos, when I go to MD they never can find anything\" no contacts, no injury, vision unchanged.        "

## 2023-09-20 ENCOUNTER — NURSE TRIAGE (OUTPATIENT)
Dept: NURSING | Facility: CLINIC | Age: 53
End: 2023-09-20
Payer: COMMERCIAL

## 2023-09-21 NOTE — TELEPHONE ENCOUNTER
Patient states she is trying to get ahold of a PA in the ED.    Patient states she was recommended to follow up with PCP and start on some preventative medication for her headaches.    Patient states she can't get an appointment to her PCP for a week.    I explained that is not going to be something the provider in the ED will start you on.  I advised for the patient to reach out to her PCP and explain the situation and maybe they would be willing to start you on medication before seeing you in clinic.  She would need to ask.    Patient states she will message her provider and ask.    Nely Jones RN   09/20/23 8:57 PM  [facility] Nurse Advisor  Reason for Disposition   Prescription request for new medicine (not a refill)    Additional Information   Negative: [1] Intentional drug overdose AND [2] suicidal thoughts or ideas   Negative: Drug overdose and triager unable to answer question   Negative: Caller requesting a renewal or refill of a medicine patient is currently taking   Negative: Caller requesting information unrelated to medicine   Negative: Caller requesting information about COVID-19 Vaccine   Negative: Caller requesting information about Emergency Contraception   Negative: Caller requesting information about Combined Birth Control Pills   Negative: Caller requesting information about Progestin Birth Control Pills   Negative: Caller requesting information about Post-Op pain or medicines   Negative: Caller requesting a prescription antibiotic (such as Penicillin) for Strep throat and has a positive culture result   Negative: Caller requesting a prescription anti-viral med (such as Tamiflu) and has influenza (flu) symptoms   Negative: Immunization reaction suspected   Negative: Rash while taking a medicine or within 3 days of stopping it   Negative: [1] Asthma and [2] having symptoms of asthma (cough, wheezing, etc.)   Negative: [1] Symptom of illness (e.g., headache, abdominal pain, earache,  vomiting) AND [2] more than mild   Negative: Breastfeeding questions about mother's medicines and diet   Negative: MORE THAN A DOUBLE DOSE of a prescription or over-the-counter (OTC) drug   Negative: [1] DOUBLE DOSE (an extra dose or lesser amount) of prescription drug AND [2] any symptoms (e.g., dizziness, nausea, pain, sleepiness)   Negative: [1] DOUBLE DOSE (an extra dose or lesser amount) of over-the-counter (OTC) drug AND [2] any symptoms (e.g., dizziness, nausea, pain, sleepiness)   Negative: Took another person's prescription drug   Negative: [1] DOUBLE DOSE (an extra dose or lesser amount) of prescription drug AND [2] NO symptoms  (Exception: A double dose of antibiotics.)   Negative: Diabetes drug error or overdose (e.g., took wrong type of insulin or took extra dose)   Negative: [1] Prescription not at pharmacy AND [2] was prescribed by PCP recently (Exception: Triager has access to EMR and prescription is recorded there. Go to Home Care and confirm for pharmacy.)   Negative: [1] Pharmacy calling with prescription question AND [2] triager unable to answer question   Negative: [1] Caller has URGENT medicine question about med that PCP or specialist prescribed AND [2] triager unable to answer question   Negative: Medicine patch causing local rash or itching   Negative: [1] Caller has medicine question about med NOT prescribed by PCP AND [2] triager unable to answer question (e.g., compatibility with other med, storage)    Protocols used: Medication Question Call-A-

## 2023-09-28 ENCOUNTER — TRANSCRIBE ORDERS (OUTPATIENT)
Dept: OTHER | Age: 53
End: 2023-09-28

## 2023-09-28 DIAGNOSIS — H57.10 EYE PAIN: ICD-10-CM

## 2023-09-28 DIAGNOSIS — G44.009 CLUSTER HEADACHES: Primary | ICD-10-CM

## 2024-02-13 PROBLEM — L40.50 PSORIATIC ARTHRITIS (H): Status: ACTIVE | Noted: 2019-07-11

## 2024-02-13 PROBLEM — Z86.19 H/O LYME DISEASE: Status: ACTIVE | Noted: 2017-03-17

## 2024-02-13 NOTE — PROGRESS NOTES
NEUROLOGY CONSULTATION NOTE       [facility] NEUROLOGY [place]  [address] [place] [address]  Tel: [phone]  Fax: [phone]  [email or web address]     Ivette Carmona,  1970, MRN [id number]  PCP: Luz Montanez  Date: 2024     ASSESSMENT & PLAN     Visit Diagnosis  SUNCT (short unilateral neuralgiform headache, conjunctival inj/tear)     Short unilateral neuralgiform headache, conjunctival injection/tear (SUNCT)  53-year-old female with history of psoriatic arthritis, gestational diabetes and history of Lyme disease with 6 months history weekly headaches that are on both sides left greater than right.  Her description sounds typical for trigeminal autonomic cephalgias(TACs) and although cluster headache is a possibility due to longer intervals and her gender I suspect she has short lasting unilateral neuralgiform headache attacks with conjunctival injections and tearing of (SUNCT).  Oxygen is helpful in aborting TACs and I have recommended:    1.  Continue oxygen 8 to 12 L/min for abortive treatment  2.  Additionally use Maxalt MLT as needed  3.  For prophylaxis I would recommend starting her on lamotrigine 25 mg daily gradually increasing to 50 mg twice daily  4.  Follow-up in 2 months    Thank you again for this referral, please feel free to contact me if you have any questions.    Abhishek Burnette MD  [facility]  (Formerly, [facility]     REASON FOR CONSULTATION Headache        HISTORY OF PRESENT ILLNESS     We have been requested by Luz Montanez  to evaluate Ivette Carmona who is a 53 year old  female for headache    Patient is a 53-year-old female with history of psoriatic arthritis, gestational diabetes and history of Lyme disease who was referred for evaluation headache along withAdmission  Eye pain and watering.  According to patient her symptoms started 6 months ago.  These headaches occur once a  week.  She usually notices severe pain behind her left eye that is followed by watering of the eye and the severe throbbing pain.  This can last for 3 to 4 hours.  During 1 such episode she ended up in the ER and had MRI of the brain that was essentially normal.  There are times when she gets similar symptoms on the right side but mostly her headaches are on the left side.  Her primary physician prescribed oxygen that does help to abort the attack.  She has also used Maxalt.  Although her  smokes which is a known trigger she claims he always smokes outside     PROBLEM LIST   Patient Active Problem List   Diagnosis Code    Gestational diabetes O24.419    Psoriatic arthritis (H) L40.50    H/o Lyme disease Z86.19         PAST MEDICAL & SURGICAL HISTORY     Past Medical History:   Patient  has a past medical history of Psoriatic arthritis (H).    Surgical History:  She  has no past surgical history on file.     SOCIAL HISTORY     Reviewed, and she  reports that she has never smoked. She has never used smokeless tobacco. She reports that she does not currently use alcohol. She reports that she does not currently use drugs.     FAMILY HISTORY     Reviewed, and family history includes Cerebrovascular Disease in her maternal grandfather; Leukemia in her maternal grandmother; Substance Abuse in her maternal half-brother.     ALLERGIES     No Known Allergies      REVIEW OF SYSTEMS     A 12 point review of system was performed and was negative except as outlined in the history of present illness.     HOME MEDICATIONS     Current Outpatient Rx   Medication Sig Dispense Refill    Calcium Citrate-Vitamin D 1500-200 MG-UNIT TABS Take 2 tablets by mouth daily.      folic acid (FOLVITE) 1 MG tablet Take 1,000 mcg by mouth daily      HYDROcodone-acetaminophen (NORCO)  MG per tablet TAKE 1 TABLET BY MOUTH THREE TIMES A DAY AS NEEDED FOR PAIN      inFLIXimab (REMICADE IV) Inject into the vein every 30 days      lamoTRIgine  "(LAMICTAL) 25 MG tablet Take 1 tablet (25 mg) by mouth daily for 14 days, THEN 1 tablet (25 mg) 2 times daily for 14 days, THEN 2 tablets (50 mg) 2 times daily for 14 days. 98 tablet 0    [START ON 3/28/2024] lamoTRIgine (LAMICTAL) 25 MG tablet Take 2 tablets (50 mg) by mouth 2 times daily 120 tablet 6    levothyroxine (SYNTHROID/LEVOTHROID) 75 MCG tablet Take 75 mcg by mouth daily      methotrexate sodium 2.5 MG TABS Taking 6 tablets weekly      rizatriptan (MAXALT-MLT) 10 MG ODT DISSOLVE 1 TABLET UNDER THE TONGUE AT HEADACHE ONSET MR IN 2 HRS AS NEEDED (MAX 30MG/24HRS)      zolpidem ER (AMBIEN CR) 12.5 MG CR tablet Take 12.5 mg by mouth nightly as needed for sleep      naloxone (NARCAN) 4 MG/0.1ML nasal spray SPRAY 1 SPRAY INTO NOSTRIL IF NEEDED. MAY REPEAT DOSE EVERY 2-3 MINUTES ALTERNATING NOSTRILS           PHYSICAL EXAM     Vital signs  /79 (BP Location: Right arm, Patient Position: Sitting)   Pulse 76   Ht 1.702 m (5' 7\")   Wt 55.8 kg (123 lb)   BMI 19.26 kg/m      Weight:   123 lbs 0 oz    Middle-age female who is alert and oriented vital signs were reviewed and documented in electronic medical record.  Neck supple.  Neurologically speech was normal cranial nerves II through XII are intact motor strength 5/5 reflexes 2+ toes downgoing sensation intact gait normal     PERTINENT DIAGNOSTIC STUDIES     Following studies were reviewed:     MRI BRAIN 9/18/2023  1.  Unremarkable brain MRI. No acute infarct, mass, or hemorrhage.  2.  Slight focal asymmetric dilation along the posterior-superior margin of the right lateral ventricle with mild surrounding T2 FLAIR hyperintensity. This could reflect remote injury or developmental variation.     PERTINENT LABS  Following labs were reviewed:  No visits with results within 3 Month(s) from this visit.   Latest known visit with results is:   Admission on 09/18/2023, Discharged on 09/18/2023   Component Date Value Ref Range Status    Sodium 09/18/2023 140  136 - " 145 mmol/L Final    Potassium 09/18/2023 4.4  3.4 - 5.3 mmol/L Final    Chloride 09/18/2023 105  98 - 107 mmol/L Final    Carbon Dioxide (CO2) 09/18/2023 24  22 - 29 mmol/L Final    Anion Gap 09/18/2023 11  7 - 15 mmol/L Final    Urea Nitrogen 09/18/2023 14.0  6.0 - 20.0 mg/dL Final    Creatinine 09/18/2023 0.73  0.51 - 0.95 mg/dL Final    Calcium 09/18/2023 9.4  8.6 - 10.0 mg/dL Final    Glucose 09/18/2023 95  70 - 99 mg/dL Final    GFR Estimate 09/18/2023 >90  >60 mL/min/1.73m2 Final    WBC Count 09/18/2023 5.7  4.0 - 11.0 10e3/uL Final    RBC Count 09/18/2023 4.13  3.80 - 5.20 10e6/uL Final    Hemoglobin 09/18/2023 13.4  11.7 - 15.7 g/dL Final    Hematocrit 09/18/2023 38.8  35.0 - 47.0 % Final    MCV 09/18/2023 94  78 - 100 fL Final    MCH 09/18/2023 32.4  26.5 - 33.0 pg Final    MCHC 09/18/2023 34.5  31.5 - 36.5 g/dL Final    RDW 09/18/2023 12.6  10.0 - 15.0 % Final    Platelet Count 09/18/2023 249  150 - 450 10e3/uL Final    % Neutrophils 09/18/2023 58  % Final    % Lymphocytes 09/18/2023 35  % Final    % Monocytes 09/18/2023 5  % Final    % Eosinophils 09/18/2023 1  % Final    % Basophils 09/18/2023 1  % Final    % Immature Granulocytes 09/18/2023 0  % Final    NRBCs per 100 WBC 09/18/2023 0  <1 /100 Final    Absolute Neutrophils 09/18/2023 3.3  1.6 - 8.3 10e3/uL Final    Absolute Lymphocytes 09/18/2023 2.0  0.8 - 5.3 10e3/uL Final    Absolute Monocytes 09/18/2023 0.3  0.0 - 1.3 10e3/uL Final    Absolute Eosinophils 09/18/2023 0.1  0.0 - 0.7 10e3/uL Final    Absolute Basophils 09/18/2023 0.0  0.0 - 0.2 10e3/uL Final    Absolute Immature Granulocytes 09/18/2023 0.0  <=0.4 10e3/uL Final    Absolute NRBCs 09/18/2023 0.0  10e3/uL Final    Hold Specimen 09/18/2023 [facility]   Final        Total time spent for face to face visit, reviewing labs/imaging studies, counseling and coordination of care was: 1 Hour spent on the date of the encounter doing chart review, review of outside records, review of test results,  interpretation of tests, patient visit, and documentation     This note was dictated using voice recognition software.  Any grammatical or context distortions are unintentional and inherent to the software.    No orders of the defined types were placed in this encounter.     New Prescriptions    LAMOTRIGINE (LAMICTAL) 25 MG TABLET    Take 1 tablet (25 mg) by mouth daily for 14 days, THEN 1 tablet (25 mg) 2 times daily for 14 days, THEN 2 tablets (50 mg) 2 times daily for 14 days.    LAMOTRIGINE (LAMICTAL) 25 MG TABLET    Take 2 tablets (50 mg) by mouth 2 times daily      Modified Medications    No medications on file

## 2024-02-14 ENCOUNTER — OFFICE VISIT (OUTPATIENT)
Dept: NEUROLOGY | Facility: CLINIC | Age: 54
End: 2024-02-14
Attending: PHYSICIAN ASSISTANT
Payer: COMMERCIAL

## 2024-02-14 VITALS
SYSTOLIC BLOOD PRESSURE: 120 MMHG | HEIGHT: 67 IN | HEART RATE: 76 BPM | WEIGHT: 123 LBS | BODY MASS INDEX: 19.3 KG/M2 | DIASTOLIC BLOOD PRESSURE: 79 MMHG

## 2024-02-14 DIAGNOSIS — G44.059 SUNCT (SHORT UNILATERAL NEURALGIFORM HEADACHE, CONJUNCTIVAL INJ/TEAR): Primary | ICD-10-CM

## 2024-02-14 PROCEDURE — 99205 OFFICE O/P NEW HI 60 MIN: CPT | Performed by: PSYCHIATRY & NEUROLOGY

## 2024-02-14 RX ORDER — LAMOTRIGINE 25 MG/1
50 TABLET ORAL 2 TIMES DAILY
Qty: 120 TABLET | Refills: 6 | Status: SHIPPED | OUTPATIENT
Start: 2024-03-28 | End: 2024-04-15

## 2024-02-14 RX ORDER — ZOLPIDEM TARTRATE 12.5 MG/1
12.5 TABLET, FILM COATED, EXTENDED RELEASE ORAL
COMMUNITY
Start: 2024-01-31

## 2024-02-14 RX ORDER — LAMOTRIGINE 25 MG/1
TABLET ORAL
Qty: 98 TABLET | Refills: 0 | Status: SHIPPED | OUTPATIENT
Start: 2024-02-14 | End: 2024-03-26

## 2024-02-14 RX ORDER — RIZATRIPTAN BENZOATE 10 MG/1
TABLET, ORALLY DISINTEGRATING ORAL
COMMUNITY
Start: 2023-12-24

## 2024-02-14 ASSESSMENT — HEADACHE IMPACT TEST (HIT 6)
HOW OFTEN DO HEADACHES LIMIT YOUR DAILY ACTIVITIES: VERY OFTEN
HIT6 TOTAL SCORE: 70
WHEN YOU HAVE A HEADACHE HOW OFTEN DO YOU WISH YOU COULD LIE DOWN: ALWAYS
WHEN YOU HAVE HEADACHES HOW OFTEN IS THE PAIN SEVERE: VERY OFTEN
HOW OFTEN DID HEADACHS LIMIT CONCENTRATION ON WORK OR DAILY ACTIVITY: VERY OFTEN
HOW OFTEN HAVE YOU FELT FED UP OR IRRITATED BECAUSE OF YOUR HEADACHES: ALWAYS
HOW OFTEN HAVE YOU FELT FED UP OR IRRITATED BECAUSE OF YOUR HEADACHES: ALWAYS
HIT6 TOTAL SCORE: 70
HOW OFTEN DID HEADACHS LIMIT CONCENTRATION ON WORK OR DAILY ACTIVITY: VERY OFTEN
WHEN YOU HAVE A HEADACHE HOW OFTEN DO YOU WISH YOU COULD LIE DOWN: ALWAYS
WHEN YOU HAVE HEADACHES HOW OFTEN IS THE PAIN SEVERE: VERY OFTEN
HOW OFTEN HAVE YOU FELT TOO TIRED TO WORK BECAUSE OF YOUR HEADACHES: VERY OFTEN
HOW OFTEN DO HEADACHES LIMIT YOUR DAILY ACTIVITIES: VERY OFTEN
HOW OFTEN HAVE YOU FELT TOO TIRED TO WORK BECAUSE OF YOUR HEADACHES: VERY OFTEN

## 2024-02-14 NOTE — NURSING NOTE
Chief Complaint   Patient presents with    Headache     Patient reports headaches 1x per week- has had for many years but has not seen neurology      Last Patient-Answered HIT-6 Questionnaire      2/14/2024    10:01 AM   HIT-6   When you have headaches, how often is the pain severe 11   How often do headaches limit your ability to do usual daily activities including household work, work, school, or social activities? 11   When you have a headache, how often do you wish you could lie down? 13   In the past 4 weeks, how often have you felt too tired to do work or daily activities because of your headaches 11   In the past 4 weeks, how often have you felt fed up or irritated because of your headaches 13   In the past 4 weeks, how often did headaches limit your ability to concentrate on work or daily activities 11   HIT-6 Total Score 70     Triny Lubin CMA on 2/14/2024 at 10:01 AM  [facility]

## 2024-03-26 DIAGNOSIS — G44.059 SUNCT (SHORT UNILATERAL NEURALGIFORM HEADACHE, CONJUNCTIVAL INJ/TEAR): ICD-10-CM

## 2024-03-26 RX ORDER — LAMOTRIGINE 25 MG/1
50 TABLET ORAL 2 TIMES DAILY
Qty: 360 TABLET | Refills: 3 | Status: SHIPPED | OUTPATIENT
Start: 2024-03-26 | End: 2024-04-15

## 2024-03-26 NOTE — TELEPHONE ENCOUNTER
Request for Lamictal 50mg BID to be sent to pharmacy- due to be sent 3/28/24 but patient would like to  rx instead of waiting until the last day  Medication T'd for review and signature  Triny Lubin CMA on 3/26/2024 at 9:18 AM  [facility]

## 2024-04-09 NOTE — PROGRESS NOTES
NEUROLOGY FOLLOW UP VISIT  NOTE       [facility] NEUROLOGY [place]  [address] [place] [address]  Tel: [phone]  Fax: [phone]  [email or web address]     Ivette Carmona,  1970, MRN [id number]  PCP: Luz Montanez  Date: 04/15/2024      ASSESSMENT & PLAN     Visit Diagnosis  SUNCT (short unilateral neuralgiform headache, conjunctival inj/tear)     Short unilateral neuralgiform headache, conjunctival injection/tear (SUNCT)  53-year-old female with history of psoriatic arthritis, gestational diabetes and history of Lyme disease with 6 months history weekly headaches that are on both sides left greater than right.  Her description sounds typical for trigeminal autonomic cephalgias(TACs) and although cluster headache is a possibility due to longer intervals and her gender I suspect she has short lasting unilateral neuralgiform headache attacks with conjunctival injections and tearing of (SUNCT).  Oxygen is helpful in aborting TACs and she was told to continue oxygen 8 to 12 L/min for abortive treatment.  I started her on lamotrigine and although it helped with her headache she misunderstood the directions and on her own tapered herself off lamotrigine.  I told her I want at least 6 months of headache free interval before we can taper her off lamotrigine.  I have recommended:    1.  Restart lamotrigine gradually increasing to 50 mg twice a day  2.  Continue oxygen 8 to 12 L/min for abortive treatment  3.  Additionally use Maxalt MLT as needed  4.  Follow-up in 6 months      Thank you again for this referral, please feel free to contact me if you have any questions.    Abhishek Burnette MD  [facility]     HISTORY OF PRESENT ILLNESS     Patient is 53-year-old female with history of psoriatic arthritis, gestational diabetes and history of Lyme disease who was initially seen on 2024 for 6 months history of weekly headache that were on the both sides  left greater than right.  Her description sounded typical for trigeminal autonomic cephalgia (TACs) and although cluster headache was a possibility due to the longer intervals and her gender I suspected short acting unilateral neuralgiform headache attacks with conjunctival injection and tearing (SUNCT).  She was instructed to continue on oxygen 8 to 12 L/min for abortive treatment and the use Maxalt.  She was started on lamotrigine gradually increasing the dose to 50 mg twice daily.  Since her last visit she reports improvement in her symptoms on lamotrigine but she misunderstood the directions and on her own started tapering herself off lamotrigine.  She took her last dose of lamotrigine yesterday.    Briefly patient is a female with psoriatic arthritis, gestational diabetes, history of Lyme disease who developed severe unilateral headache and later part of 2023. She usually notices severe pain behind her left eye that is followed by watering of the eye and the severe throbbing pain.  This can last for 3 to 4 hours.  During 1 such episode she ended up in the ER and had MRI of the brain that was essentially normal.  There are times when she gets similar symptoms on the right side but mostly her headaches are on the left side.  Her primary physician prescribed oxygen that does help to abort the attack.  She has also used Maxalt.  Although her  smokes which is a known trigger she claims he always smokes outside     PROBLEM LIST   Patient Active Problem List   Diagnosis    Gestational diabetes    Psoriatic arthritis (H)    H/o Lyme disease         PAST MEDICAL & SURGICAL HISTORY     Past Medical History:   Patient  has a past medical history of Psoriatic arthritis (H).    Surgical History:  She  has no past surgical history on file.     SOCIAL HISTORY     Reviewed, and she  reports that she has never smoked. She has never used smokeless tobacco. She reports that she does not currently use alcohol. She reports  "that she does not currently use drugs.     FAMILY HISTORY     Reviewed, and family history includes Cerebrovascular Disease in her maternal grandfather; Leukemia in her maternal grandmother; Substance Abuse in her maternal half-brother.     ALLERGIES     No Known Allergies      REVIEW OF SYSTEMS     A 12 point review of system was performed and was negative except as outlined in the history of present illness.     HOME MEDICATIONS     Current Outpatient Rx   Medication Sig Dispense Refill    Calcium Citrate-Vitamin D 1500-200 MG-UNIT TABS Take 2 tablets by mouth daily.      folic acid (FOLVITE) 1 MG tablet Take 1,000 mcg by mouth daily      gabapentin (NEURONTIN) 100 MG capsule TAKE 1-3 CAPSULES BY MOUTH 3 TIMES PER DAY      inFLIXimab (REMICADE IV) Inject into the vein every 30 days      lamoTRIgine (LAMICTAL) 25 MG tablet Take 1 tablet twice a day for 7 days, then 2 tablets in the morning & 1 at bedtime for 7 days 35 tablet 0    [START ON 4/30/2024] lamoTRIgine (LAMICTAL) 25 MG tablet Take 2 tablets (50 mg) by mouth 2 times daily 360 tablet 3    levothyroxine (SYNTHROID/LEVOTHROID) 75 MCG tablet Take 75 mcg by mouth daily      methotrexate sodium 2.5 MG TABS Taking 6 tablets weekly      rizatriptan (MAXALT-MLT) 10 MG ODT DISSOLVE 1 TABLET UNDER THE TONGUE AT HEADACHE ONSET MR IN 2 HRS AS NEEDED (MAX 30MG/24HRS)      zolpidem ER (AMBIEN CR) 12.5 MG CR tablet Take 12.5 mg by mouth nightly as needed for sleep      naloxone (NARCAN) 4 MG/0.1ML nasal spray SPRAY 1 SPRAY INTO NOSTRIL IF NEEDED. MAY REPEAT DOSE EVERY 2-3 MINUTES ALTERNATING NOSTRILS           PHYSICAL EXAM     Vital signs  Ht 1.702 m (5' 7\")   Wt 55.8 kg (123 lb)   BMI 19.26 kg/m      Weight:   123 lbs 0 oz    Patient is alert and oriented x4 in no acute distress. Vital signs were reviewed and are documented in electronic medical record. Neck was supple, no carotid bruits, thyromegaly, JVD, or lymphadenopathy was noted.   NEUROLOGY EXAM:   Patient s " speech was normal with no aphasia or dysarthria. Mentation, and affect were also normal.    Funduscopic exam was normal, with normal cup to disc ratio. Cranial nerves II -XII were intact.    Patient had normal mass, tone and motor strength was 5/5 in all extremities without pronator drift.    Sensation was intact to light touch, pinprick, and vibratory sensation.    Reflexes were 1+ symmetrical with downgoing toes.    No dysmetria noted on FNF or HKS. Romberg was negative.   Gait testing was normal. Able to walk on toes/heels. Tandem walk normal.     PERTINENT DIAGNOSTIC STUDIES     Following studies were reviewed:     MRI BRAIN 9/18/2023  1.  Unremarkable brain MRI. No acute infarct, mass, or hemorrhage.  2.  Slight focal asymmetric dilation along the posterior-superior margin of the right lateral ventricle with mild surrounding T2 FLAIR hyperintensity. This could reflect remote injury or developmental variation.     PERTINENT LABS  Following labs were reviewed:  No visits with results within 3 Month(s) from this visit.   Latest known visit with results is:   Admission on 09/18/2023, Discharged on 09/18/2023   Component Date Value Ref Range Status    Sodium 09/18/2023 140  136 - 145 mmol/L Final    Potassium 09/18/2023 4.4  3.4 - 5.3 mmol/L Final    Chloride 09/18/2023 105  98 - 107 mmol/L Final    Carbon Dioxide (CO2) 09/18/2023 24  22 - 29 mmol/L Final    Anion Gap 09/18/2023 11  7 - 15 mmol/L Final    Urea Nitrogen 09/18/2023 14.0  6.0 - 20.0 mg/dL Final    Creatinine 09/18/2023 0.73  0.51 - 0.95 mg/dL Final    Calcium 09/18/2023 9.4  8.6 - 10.0 mg/dL Final    Glucose 09/18/2023 95  70 - 99 mg/dL Final    GFR Estimate 09/18/2023 >90  >60 mL/min/1.73m2 Final    WBC Count 09/18/2023 5.7  4.0 - 11.0 10e3/uL Final    RBC Count 09/18/2023 4.13  3.80 - 5.20 10e6/uL Final    Hemoglobin 09/18/2023 13.4  11.7 - 15.7 g/dL Final    Hematocrit 09/18/2023 38.8  35.0 - 47.0 % Final    MCV 09/18/2023 94  78 - 100 fL Final     MCH 09/18/2023 32.4  26.5 - 33.0 pg Final    MCHC 09/18/2023 34.5  31.5 - 36.5 g/dL Final    RDW 09/18/2023 12.6  10.0 - 15.0 % Final    Platelet Count 09/18/2023 249  150 - 450 10e3/uL Final    % Neutrophils 09/18/2023 58  % Final    % Lymphocytes 09/18/2023 35  % Final    % Monocytes 09/18/2023 5  % Final    % Eosinophils 09/18/2023 1  % Final    % Basophils 09/18/2023 1  % Final    % Immature Granulocytes 09/18/2023 0  % Final    NRBCs per 100 WBC 09/18/2023 0  <1 /100 Final    Absolute Neutrophils 09/18/2023 3.3  1.6 - 8.3 10e3/uL Final    Absolute Lymphocytes 09/18/2023 2.0  0.8 - 5.3 10e3/uL Final    Absolute Monocytes 09/18/2023 0.3  0.0 - 1.3 10e3/uL Final    Absolute Eosinophils 09/18/2023 0.1  0.0 - 0.7 10e3/uL Final    Absolute Basophils 09/18/2023 0.0  0.0 - 0.2 10e3/uL Final    Absolute Immature Granulocytes 09/18/2023 0.0  <=0.4 10e3/uL Final    Absolute NRBCs 09/18/2023 0.0  10e3/uL Final    Hold Specimen 09/18/2023 [facility]   Final         Total time spent for face to face visit, reviewing labs/imaging studies, counseling and coordination of care was: 30 Minutes spent on the date of the encounter doing chart review, review of outside records, review of test results, interpretation of tests, patient visit, and documentation     The longitudinal plan of care for the diagnosis(es)/condition(s) as documented were addressed during this visit. Due to the added complexity in care, I will continue to support Pankaj in the subsequent management and with ongoing continuity of care.    This note was dictated using voice recognition software.  Any grammatical or context distortions are unintentional and inherent to the software.    Orders Placed This Encounter   Procedures    Hepatic function panel      New Prescriptions    No medications on file     Modified Medications    Modified Medication Previous Medication    LAMOTRIGINE (LAMICTAL) 25 MG TABLET lamoTRIgine (LAMICTAL) 25 MG tablet       Take 1 tablet twice a day  for 7 days, then 2 tablets in the morning & 1 at bedtime for 7 days    Take 2 tablets (50 mg) by mouth 2 times daily    LAMOTRIGINE (LAMICTAL) 25 MG TABLET lamoTRIgine (LAMICTAL) 25 MG tablet       Take 2 tablets (50 mg) by mouth 2 times daily    Take 2 tablets (50 mg) by mouth 2 times daily for 360 days

## 2024-04-15 ENCOUNTER — LAB (OUTPATIENT)
Dept: LAB | Facility: HOSPITAL | Age: 54
End: 2024-04-15
Payer: COMMERCIAL

## 2024-04-15 ENCOUNTER — OFFICE VISIT (OUTPATIENT)
Dept: NEUROLOGY | Facility: CLINIC | Age: 54
End: 2024-04-15
Payer: COMMERCIAL

## 2024-04-15 VITALS — HEIGHT: 67 IN | WEIGHT: 123 LBS | BODY MASS INDEX: 19.3 KG/M2

## 2024-04-15 DIAGNOSIS — G44.059 SUNCT (SHORT UNILATERAL NEURALGIFORM HEADACHE, CONJUNCTIVAL INJ/TEAR): Primary | ICD-10-CM

## 2024-04-15 DIAGNOSIS — G44.059 SUNCT (SHORT UNILATERAL NEURALGIFORM HEADACHE, CONJUNCTIVAL INJ/TEAR): ICD-10-CM

## 2024-04-15 LAB
ALBUMIN SERPL BCG-MCNC: 4.3 G/DL (ref 3.5–5.2)
ALP SERPL-CCNC: 109 U/L (ref 40–150)
ALT SERPL W P-5'-P-CCNC: 202 U/L (ref 0–50)
AST SERPL W P-5'-P-CCNC: 71 U/L (ref 0–45)
BILIRUB DIRECT SERPL-MCNC: <0.2 MG/DL (ref 0–0.3)
BILIRUB SERPL-MCNC: 0.4 MG/DL
PROT SERPL-MCNC: 7 G/DL (ref 6.4–8.3)

## 2024-04-15 PROCEDURE — 36415 COLL VENOUS BLD VENIPUNCTURE: CPT

## 2024-04-15 PROCEDURE — 84450 TRANSFERASE (AST) (SGOT): CPT

## 2024-04-15 PROCEDURE — 99214 OFFICE O/P EST MOD 30 MIN: CPT | Performed by: PSYCHIATRY & NEUROLOGY

## 2024-04-15 PROCEDURE — G2211 COMPLEX E/M VISIT ADD ON: HCPCS | Performed by: PSYCHIATRY & NEUROLOGY

## 2024-04-15 PROCEDURE — 84460 ALANINE AMINO (ALT) (SGPT): CPT

## 2024-04-15 RX ORDER — LAMOTRIGINE 25 MG/1
50 TABLET ORAL 2 TIMES DAILY
Qty: 360 TABLET | Refills: 3 | Status: SHIPPED | OUTPATIENT
Start: 2024-04-30

## 2024-04-15 RX ORDER — GABAPENTIN 100 MG/1
CAPSULE ORAL
COMMUNITY
Start: 2024-03-06

## 2024-04-15 RX ORDER — LAMOTRIGINE 25 MG/1
TABLET ORAL
Qty: 35 TABLET | Refills: 0 | Status: SHIPPED | OUTPATIENT
Start: 2024-04-15 | End: 2024-04-29

## 2024-04-15 ASSESSMENT — HEADACHE IMPACT TEST (HIT 6)
WHEN YOU HAVE A HEADACHE HOW OFTEN DO YOU WISH YOU COULD LIE DOWN: ALWAYS
HOW OFTEN HAVE YOU FELT TOO TIRED TO WORK BECAUSE OF YOUR HEADACHES: SOMETIMES
HOW OFTEN DO HEADACHES LIMIT YOUR DAILY ACTIVITIES: VERY OFTEN
HOW OFTEN HAVE YOU FELT FED UP OR IRRITATED BECAUSE OF YOUR HEADACHES: ALWAYS
HOW OFTEN DID HEADACHS LIMIT CONCENTRATION ON WORK OR DAILY ACTIVITY: SOMETIMES
HIT6 TOTAL SCORE: 68
WHEN YOU HAVE HEADACHES HOW OFTEN IS THE PAIN SEVERE: VERY OFTEN

## 2024-04-15 NOTE — RESULT ENCOUNTER NOTE
Deacon Lira    Liver enzymes are elevated.  I would recommend seeing your primary care physician for further evaluation of the reason for elevated liver enzymes    Regards,  Abhishek Burnette MD

## 2024-04-15 NOTE — NURSING NOTE
Chief Complaint   Patient presents with    Short unilateral neuralgiform headache, conjunctival inject     2 month follow up- patient reports headache frequency has decreased. Patient reports she titrated lamictal but misunderstood the direction to continue the medication and has stopped it      Triny Lubin CMA on 4/15/2024 at 9:29 AM  [facility]

## 2024-04-15 NOTE — LETTER
4/15/2024         RE: Ivette Carmona  [address]  [place] [address]        Dear Colleague,    Thank you for referring your patient, Ivette Carmona, to the [facility] [place]. Please see a copy of my visit note below.    NEUROLOGY FOLLOW UP VISIT  NOTE       [facility] [place]  [address] [place] [address]  Tel: [phone]  Fax: [phone]  [email or web address]     Ivette Carmona,  1970, MRN [id number]  PCP: Luz Montanez  Date: 04/15/2024      ASSESSMENT & PLAN     Visit Diagnosis  SUNCT (short unilateral neuralgiform headache, conjunctival inj/tear)     Short unilateral neuralgiform headache, conjunctival injection/tear (SUNCT)  53-year-old female with history of psoriatic arthritis, gestational diabetes and history of Lyme disease with 6 months history weekly headaches that are on both sides left greater than right.  Her description sounds typical for trigeminal autonomic cephalgias(TACs) and although cluster headache is a possibility due to longer intervals and her gender I suspect she has short lasting unilateral neuralgiform headache attacks with conjunctival injections and tearing of (SUNCT).  Oxygen is helpful in aborting TACs and she was told to continue oxygen 8 to 12 L/min for abortive treatment.  I started her on lamotrigine and although it helped with her headache she misunderstood the directions and on her own tapered herself off lamotrigine.  I told her I want at least 6 months of headache free interval before we can taper her off lamotrigine.  I have recommended:    1.  Restart lamotrigine gradually increasing to 50 mg twice a day  2.  Continue oxygen 8 to 12 L/min for abortive treatment  3.  Additionally use Maxalt MLT as needed  4.  Follow-up in 6 months      Thank you again for this referral, please feel free to contact me if you have any questions.    Abhishek Burnette MD  [facility],  [place]     HISTORY OF PRESENT ILLNESS     Patient is 53-year-old female with history of psoriatic arthritis, gestational diabetes and history of Lyme disease who was initially seen on 2/14/2024 for 6 months history of weekly headache that were on the both sides left greater than right.  Her description sounded typical for trigeminal autonomic cephalgia (TACs) and although cluster headache was a possibility due to the longer intervals and her gender I suspected short acting unilateral neuralgiform headache attacks with conjunctival injection and tearing (SUNCT).  She was instructed to continue on oxygen 8 to 12 L/min for abortive treatment and the use Maxalt.  She was started on lamotrigine gradually increasing the dose to 50 mg twice daily.  Since her last visit she reports improvement in her symptoms on lamotrigine but she misunderstood the directions and on her own started tapering herself off lamotrigine.  She took her last dose of lamotrigine yesterday.    Briefly patient is a female with psoriatic arthritis, gestational diabetes, history of Lyme disease who developed severe unilateral headache and later part of 2023. She usually notices severe pain behind her left eye that is followed by watering of the eye and the severe throbbing pain.  This can last for 3 to 4 hours.  During 1 such episode she ended up in the ER and had MRI of the brain that was essentially normal.  There are times when she gets similar symptoms on the right side but mostly her headaches are on the left side.  Her primary physician prescribed oxygen that does help to abort the attack.  She has also used Maxalt.  Although her  smokes which is a known trigger she claims he always smokes outside     PROBLEM LIST   Patient Active Problem List   Diagnosis     Gestational diabetes     Psoriatic arthritis (H)     H/o Lyme disease         PAST MEDICAL & SURGICAL HISTORY     Past Medical History:   Patient  has a past medical history of  "Psoriatic arthritis (H).    Surgical History:  She  has no past surgical history on file.     SOCIAL HISTORY     Reviewed, and she  reports that she has never smoked. She has never used smokeless tobacco. She reports that she does not currently use alcohol. She reports that she does not currently use drugs.     FAMILY HISTORY     Reviewed, and family history includes Cerebrovascular Disease in her maternal grandfather; Leukemia in her maternal grandmother; Substance Abuse in her maternal half-brother.     ALLERGIES     No Known Allergies      REVIEW OF SYSTEMS     A 12 point review of system was performed and was negative except as outlined in the history of present illness.     HOME MEDICATIONS     Current Outpatient Rx   Medication Sig Dispense Refill     Calcium Citrate-Vitamin D 1500-200 MG-UNIT TABS Take 2 tablets by mouth daily.       folic acid (FOLVITE) 1 MG tablet Take 1,000 mcg by mouth daily       gabapentin (NEURONTIN) 100 MG capsule TAKE 1-3 CAPSULES BY MOUTH 3 TIMES PER DAY       inFLIXimab (REMICADE IV) Inject into the vein every 30 days       lamoTRIgine (LAMICTAL) 25 MG tablet Take 1 tablet twice a day for 7 days, then 2 tablets in the morning & 1 at bedtime for 7 days 35 tablet 0     [START ON 4/30/2024] lamoTRIgine (LAMICTAL) 25 MG tablet Take 2 tablets (50 mg) by mouth 2 times daily 360 tablet 3     levothyroxine (SYNTHROID/LEVOTHROID) 75 MCG tablet Take 75 mcg by mouth daily       methotrexate sodium 2.5 MG TABS Taking 6 tablets weekly       rizatriptan (MAXALT-MLT) 10 MG ODT DISSOLVE 1 TABLET UNDER THE TONGUE AT HEADACHE ONSET MR IN 2 HRS AS NEEDED (MAX 30MG/24HRS)       zolpidem ER (AMBIEN CR) 12.5 MG CR tablet Take 12.5 mg by mouth nightly as needed for sleep       naloxone (NARCAN) 4 MG/0.1ML nasal spray SPRAY 1 SPRAY INTO NOSTRIL IF NEEDED. MAY REPEAT DOSE EVERY 2-3 MINUTES ALTERNATING NOSTRILS           PHYSICAL EXAM     Vital signs  Ht 1.702 m (5' 7\")   Wt 55.8 kg (123 lb)   BMI 19.26 " kg/m      Weight:   123 lbs 0 oz    Patient is alert and oriented x4 in no acute distress. Vital signs were reviewed and are documented in electronic medical record. Neck was supple, no carotid bruits, thyromegaly, JVD, or lymphadenopathy was noted.   NEUROLOGY EXAM:    Patient s speech was normal with no aphasia or dysarthria. Mentation, and affect were also normal.     Funduscopic exam was normal, with normal cup to disc ratio. Cranial nerves II -XII were intact.     Patient had normal mass, tone and motor strength was 5/5 in all extremities without pronator drift.     Sensation was intact to light touch, pinprick, and vibratory sensation.     Reflexes were 1+ symmetrical with downgoing toes.     No dysmetria noted on FNF or HKS. Romberg was negative.    Gait testing was normal. Able to walk on toes/heels. Tandem walk normal.     PERTINENT DIAGNOSTIC STUDIES     Following studies were reviewed:     MRI BRAIN 9/18/2023  1.  Unremarkable brain MRI. No acute infarct, mass, or hemorrhage.  2.  Slight focal asymmetric dilation along the posterior-superior margin of the right lateral ventricle with mild surrounding T2 FLAIR hyperintensity. This could reflect remote injury or developmental variation.     PERTINENT LABS  Following labs were reviewed:  No visits with results within 3 Month(s) from this visit.   Latest known visit with results is:   Admission on 09/18/2023, Discharged on 09/18/2023   Component Date Value Ref Range Status     Sodium 09/18/2023 140  136 - 145 mmol/L Final     Potassium 09/18/2023 4.4  3.4 - 5.3 mmol/L Final     Chloride 09/18/2023 105  98 - 107 mmol/L Final     Carbon Dioxide (CO2) 09/18/2023 24  22 - 29 mmol/L Final     Anion Gap 09/18/2023 11  7 - 15 mmol/L Final     Urea Nitrogen 09/18/2023 14.0  6.0 - 20.0 mg/dL Final     Creatinine 09/18/2023 0.73  0.51 - 0.95 mg/dL Final     Calcium 09/18/2023 9.4  8.6 - 10.0 mg/dL Final     Glucose 09/18/2023 95  70 - 99 mg/dL Final     GFR Estimate  09/18/2023 >90  >60 mL/min/1.73m2 Final     WBC Count 09/18/2023 5.7  4.0 - 11.0 10e3/uL Final     RBC Count 09/18/2023 4.13  3.80 - 5.20 10e6/uL Final     Hemoglobin 09/18/2023 13.4  11.7 - 15.7 g/dL Final     Hematocrit 09/18/2023 38.8  35.0 - 47.0 % Final     MCV 09/18/2023 94  78 - 100 fL Final     MCH 09/18/2023 32.4  26.5 - 33.0 pg Final     MCHC 09/18/2023 34.5  31.5 - 36.5 g/dL Final     RDW 09/18/2023 12.6  10.0 - 15.0 % Final     Platelet Count 09/18/2023 249  150 - 450 10e3/uL Final     % Neutrophils 09/18/2023 58  % Final     % Lymphocytes 09/18/2023 35  % Final     % Monocytes 09/18/2023 5  % Final     % Eosinophils 09/18/2023 1  % Final     % Basophils 09/18/2023 1  % Final     % Immature Granulocytes 09/18/2023 0  % Final     NRBCs per 100 WBC 09/18/2023 0  <1 /100 Final     Absolute Neutrophils 09/18/2023 3.3  1.6 - 8.3 10e3/uL Final     Absolute Lymphocytes 09/18/2023 2.0  0.8 - 5.3 10e3/uL Final     Absolute Monocytes 09/18/2023 0.3  0.0 - 1.3 10e3/uL Final     Absolute Eosinophils 09/18/2023 0.1  0.0 - 0.7 10e3/uL Final     Absolute Basophils 09/18/2023 0.0  0.0 - 0.2 10e3/uL Final     Absolute Immature Granulocytes 09/18/2023 0.0  <=0.4 10e3/uL Final     Absolute NRBCs 09/18/2023 0.0  10e3/uL Final     Hold Specimen 09/18/2023 [facility]   Final         Total time spent for face to face visit, reviewing labs/imaging studies, counseling and coordination of care was: 30 Minutes spent on the date of the encounter doing chart review, review of outside records, review of test results, interpretation of tests, patient visit, and documentation     The longitudinal plan of care for the diagnosis(es)/condition(s) as documented were addressed during this visit. Due to the added complexity in care, I will continue to support Pankaj in the subsequent management and with ongoing continuity of care.    This note was dictated using voice recognition software.  Any grammatical or context distortions are unintentional  and inherent to the software.    Orders Placed This Encounter   Procedures     Hepatic function panel      New Prescriptions    No medications on file     Modified Medications    Modified Medication Previous Medication    LAMOTRIGINE (LAMICTAL) 25 MG TABLET lamoTRIgine (LAMICTAL) 25 MG tablet       Take 1 tablet twice a day for 7 days, then 2 tablets in the morning & 1 at bedtime for 7 days    Take 2 tablets (50 mg) by mouth 2 times daily    LAMOTRIGINE (LAMICTAL) 25 MG TABLET lamoTRIgine (LAMICTAL) 25 MG tablet       Take 2 tablets (50 mg) by mouth 2 times daily    Take 2 tablets (50 mg) by mouth 2 times daily for 360 days                 Again, thank you for allowing me to participate in the care of your patient.        Sincerely,        Abhishek Burnette MD

## 2024-07-06 ENCOUNTER — HEALTH MAINTENANCE LETTER (OUTPATIENT)
Age: 54
End: 2024-07-06

## 2024-10-11 PROBLEM — G44.059 SUNCT (SHORT UNILATERAL NEURALGIFORM HEADACHE, CONJUNCTIVAL INJ/TEAR): Status: ACTIVE | Noted: 2024-10-11

## 2024-10-11 NOTE — PROGRESS NOTES
NEUROLOGY FOLLOW UP VISIT  NOTE       [facility] [place]  [address] [place] [address]  Tel: [phone]  Fax: [phone]  [email or web address]     Ivette Carmona,  1970, MRN [id number]  PCP: Luz Montanez  Date: 10/15/2024      ASSESSMENT & PLAN     Visit Diagnosis  SUNCT (short unilateral neuralgiform headache, conjunctival inj/tear)     Short unilateral neuralgiform headache, conjunctival injection/tear (SUNCT)  54-year-old female with history of psoriatic arthritis, gestational diabetes and history of Lyme disease with more than 1 year history of intermittent headaches that are on both sides left greater than right.  Her description sounds typical for trigeminal autonomic cephalgias(TACs) and although cluster headache is a possibility due to longer intervals and her gender I suspect she has short lasting unilateral neuralgiform headache attacks with conjunctival injections and tearing of (SUNCT).  Oxygen is helpful in aborting TACs. she was started on lamotrigine and currently gets 2 headaches in a month that respond to oxygen.  I have recommended:    1.  Increase lamotrigine to 100 mg twice daily  2.  Check lamotrigine level and hepatic panel  Previously her liver enzymes are elevated and I told her to follow-up with her primary care physician but she did not follow through on my recommendation  4.  Continue to use oxygen 8 to 12 L/min for abortive treatment in addition to Maxalt MLT as needed  5.  Follow-up in 9 months    Neurocognitive disorder  Patient reports progressive cognitive decline and she tends to struggle with short-term memory.  She scored 24/30 on MoCA.  She had a MRI of the brain in 2023 that showed no significant abnormality.  I have recommended:    1.  Lab work to include folate, HIV, homocystine, Phospho Tau 217,, MMA, RPR, TSH, B1, B12 and vitamin E  2.  Neuropsychological evaluation  3.  If above test suggested neurocognitive  disorder she might need a lumbar puncture for Alzheimer's dementia evaluation  4.  Follow-up after above test      Thank you again for this referral, please feel free to contact me if you have any questions.    Abhishek Burnette MD  [facility]     HISTORY OF PRESENT ILLNESS     Patient is a 54-year-old female with history of psoriatic arthritis, gestational diabetes and history of Lyme's disease who was last seen on 4/15/2024 for 6 months history of weekly headaches that are on both sides left greater than right.  Her description sounded typical for trigeminal autonomic cephalgias(TACs) and although cluster headache is a possibility due to longer intervals and her gender I suspect she has short lasting unilateral neuralgiform headache attacks with conjunctival injections and tearing of (SUNCT).  Oxygen is helpful in aborting TACs and she was told to continue oxygen 8 to 12 L/min for abortive treatment.  I started her on lamotrigine and although it helped with her headache she misunderstood the directions and on her own tapered herself off lamotrigine.  She was told to continue lamotrigine at least for 6 months of headache free interval before we consider tapering her off.  Since her last visit she had a lab work and was noted to have elevated AST and ALT and was told to follow-up with primary care physician.  However, she completely forgot about it.  She is also reporting cognitive struggles.  Her headaches are stable.  She gets at least 2 headaches in a month that respond to oxygen.    According to patient she has been struggling with short-term memory.  She denies any visual or auditory hallucinations.  She is using a lot of cues to remember things by Post-it notes and reminders on her smart phone.  She has some balance issues that she attributes to her psoriatic arthritis but denies any bladder incontinence.  Previous MRI brain showed age-related changes.  She has not done anything that  puts her other people at risk.    BRIEFLY patient is a female with psoriatic arthritis, gestational diabetes, history of Lyme disease who developed severe unilateral headache and later part of 2023. She usually notices severe pain behind her left eye that is followed by watering of the eye and the severe throbbing pain.  This can last for 3 to 4 hours.  During 1 such episode she ended up in the ER and had MRI of the brain that was essentially normal.  There are times when she gets similar symptoms on the right side but mostly her headaches are on the left side.  Her primary physician prescribed oxygen that does help to abort the attack.  She has also used Maxalt.  Although her  smokes which is a known trigger she claims he always smokes outside     PROBLEM LIST   Patient Active Problem List   Diagnosis    Gestational diabetes    Psoriatic arthritis (H)    H/o Lyme disease    SUNCT (short unilateral neuralgiform headache, conjunctival inj/tear)         PAST MEDICAL & SURGICAL HISTORY     Past Medical History:   Patient  has a past medical history of Psoriatic arthritis (H).    Surgical History:  She  has no past surgical history on file.     SOCIAL HISTORY     Reviewed, and she  reports that she has never smoked. She has never used smokeless tobacco. She reports that she does not currently use alcohol. She reports that she does not currently use drugs.     FAMILY HISTORY     Reviewed, and family history includes Cerebrovascular Disease in her maternal grandfather; Leukemia in her maternal grandmother; Substance Abuse in her maternal half-brother.     ALLERGIES     No Known Allergies      REVIEW OF SYSTEMS     A 12 point review of system was performed and was negative except as outlined in the history of present illness.     HOME MEDICATIONS     Current Outpatient Rx   Medication Sig Dispense Refill    Calcium Citrate-Vitamin D 1500-200 MG-UNIT TABS Take 2 tablets by mouth daily.      folic acid (FOLVITE) 1 MG  "tablet Take 1,000 mcg by mouth daily      lamoTRIgine (LAMICTAL) 100 MG tablet Take 1 tablet (100 mg) by mouth 2 times daily. 60 tablet 11    levothyroxine (SYNTHROID/LEVOTHROID) 75 MCG tablet Take 75 mcg by mouth daily      methotrexate sodium 2.5 MG TABS Taking 6 tablets weekly      ORENCIA CLICKJECT 125 MG/ML SOAJ auto-injector Inject 1 mL subcutaneously.      rizatriptan (MAXALT-MLT) 10 MG ODT DISSOLVE 1 TABLET UNDER THE TONGUE AT HEADACHE ONSET MR IN 2 HRS AS NEEDED (MAX 30MG/24HRS)      zolpidem ER (AMBIEN CR) 12.5 MG CR tablet Take 12.5 mg by mouth nightly as needed for sleep           PHYSICAL EXAM     Vital signs  /67   Pulse 85   Ht 1.702 m (5' 7\")   Wt 64.8 kg (142 lb 12.8 oz)   BMI 22.37 kg/m      Weight:   142 lbs 12.8 oz  David Cognitive Assessment:    Harrington Cognitive Assessment (MOCA)  Visuospatial/Executive : 3  Naming: 3  Attention - Digits: 2  Attention - Letters: 1  Attention - Subtraction: 2  Language - Repeat: 2  Language - Fluency : 1  Abstraction: 2  Delayed Recall: 2  Orientation: 6  Education: 0  MOCA Score: 24  Administered by: : Triny Lubin CMA     David Cognitive Assessment Score:  MOCA Score: 24/30.    Patient is alert and oriented x4 in no acute distress. Vital signs were reviewed and are documented in electronic medical record. Neck was supple, no carotid bruits, thyromegaly, JVD, or lymphadenopathy was noted.   NEUROLOGY EXAM:   Patient s speech was normal with no aphasia or dysarthria. Mentation, and affect were also normal.    Funduscopic exam was normal, with normal cup to disc ratio. Cranial nerves II -XII were intact.    Patient had normal mass, tone and motor strength was 5/5 in all extremities without pronator drift.    Sensation was intact to light touch, pinprick, and vibratory sensation.    Reflexes were 1+ symmetrical with downgoing toes.    No dysmetria noted on FNF or HKS. Romberg was negative.   Gait testing was normal. Able to walk on toes/heels. " Tandem walk normal.     PERTINENT DIAGNOSTIC STUDIES     Following studies were reviewed:     MRI BRAIN 9/18/2023  1.  Unremarkable brain MRI. No acute infarct, mass, or hemorrhage.  2.  Slight focal asymmetric dilation along the posterior-superior margin of the right lateral ventricle with mild surrounding T2 FLAIR hyperintensity. This could reflect remote injury or developmental variation.     PERTINENT LABS  Following labs were reviewed:  No visits with results within 3 Month(s) from this visit.   Latest known visit with results is:   Lab on 04/15/2024   Component Date Value Ref Range Status    Protein Total 04/15/2024 7.0  6.4 - 8.3 g/dL Final    Albumin 04/15/2024 4.3  3.5 - 5.2 g/dL Final    Bilirubin Total 04/15/2024 0.4  <=1.2 mg/dL Final    Alkaline Phosphatase 04/15/2024 109  40 - 150 U/L Final    AST 04/15/2024 71 (H)  0 - 45 U/L Final    ALT 04/15/2024 202 (H)  0 - 50 U/L Final    Bilirubin Direct 04/15/2024 <0.20  0.00 - 0.30 mg/dL Final         Total time spent for face to face visit, reviewing labs/imaging studies, counseling and coordination of care was: 45 Minutes spent on the date of the encounter doing chart review, review of outside records, review of test results, interpretation of tests, patient visit, and documentation     The longitudinal plan of care for the diagnosis(es)/condition(s) as documented were addressed during this visit. Due to the added complexity in care, I will continue to support Pankaj in the subsequent management and with ongoing continuity of care.    This note was dictated using voice recognition software.  Any grammatical or context distortions are unintentional and inherent to the software.    Orders Placed This Encounter   Procedures    Folate    Methylmalonic Acid    Homocysteine    [facility]; ; PHOSPHO , Plasma (Laboratory Miscellaneous Order)    Treponema Abs w Reflex to RPR and Titer    TSH with free T4 reflex    Vitamin B1 whole blood     Vitamin B12    Vitamin E    HIV Antigen Antibody Combo Cascade    Lamotrigine Level    Hepatic function panel    Adult Neuropsychology  Referral      New Prescriptions    No medications on file     Modified Medications    Modified Medication Previous Medication    LAMOTRIGINE (LAMICTAL) 100 MG TABLET lamoTRIgine (LAMICTAL) 25 MG tablet       Take 1 tablet (100 mg) by mouth 2 times daily.    Take 2 tablets (50 mg) by mouth 2 times daily

## 2024-10-15 ENCOUNTER — LAB (OUTPATIENT)
Dept: LAB | Facility: HOSPITAL | Age: 54
End: 2024-10-15
Payer: COMMERCIAL

## 2024-10-15 ENCOUNTER — OFFICE VISIT (OUTPATIENT)
Dept: NEUROLOGY | Facility: CLINIC | Age: 54
End: 2024-10-15
Payer: COMMERCIAL

## 2024-10-15 VITALS
SYSTOLIC BLOOD PRESSURE: 104 MMHG | BODY MASS INDEX: 22.41 KG/M2 | HEIGHT: 67 IN | DIASTOLIC BLOOD PRESSURE: 67 MMHG | HEART RATE: 85 BPM | WEIGHT: 142.8 LBS

## 2024-10-15 DIAGNOSIS — R41.9 NEUROCOGNITIVE DISORDER: ICD-10-CM

## 2024-10-15 DIAGNOSIS — G44.059 SUNCT (SHORT UNILATERAL NEURALGIFORM HEADACHE, CONJUNCTIVAL INJ/TEAR): Primary | ICD-10-CM

## 2024-10-15 DIAGNOSIS — G44.059 SUNCT (SHORT UNILATERAL NEURALGIFORM HEADACHE, CONJUNCTIVAL INJ/TEAR): ICD-10-CM

## 2024-10-15 LAB
ALBUMIN SERPL BCG-MCNC: 4.3 G/DL (ref 3.5–5.2)
ALP SERPL-CCNC: 151 U/L (ref 40–150)
ALT SERPL W P-5'-P-CCNC: 84 U/L (ref 0–50)
AST SERPL W P-5'-P-CCNC: 67 U/L (ref 0–45)
BILIRUB DIRECT SERPL-MCNC: <0.2 MG/DL (ref 0–0.3)
BILIRUB SERPL-MCNC: 0.4 MG/DL
FOLATE SERPL-MCNC: >40 NG/ML (ref 4.6–34.8)
HCYS SERPL-SCNC: 11.7 UMOL/L (ref 0–15)
HIV 1+2 AB+HIV1 P24 AG SERPL QL IA: NONREACTIVE
Lab: NORMAL
PERFORMING LABORATORY: NORMAL
PROT SERPL-MCNC: 7.3 G/DL (ref 6.4–8.3)
SPECIMEN STATUS: NORMAL
T PALLIDUM AB SER QL: NONREACTIVE
TEST NAME: NORMAL
TSH SERPL DL<=0.005 MIU/L-ACNC: 0.78 UIU/ML (ref 0.3–4.2)
VIT B12 SERPL-MCNC: 582 PG/ML (ref 232–1245)

## 2024-10-15 PROCEDURE — 87389 HIV-1 AG W/HIV-1&-2 AB AG IA: CPT

## 2024-10-15 PROCEDURE — 80076 HEPATIC FUNCTION PANEL: CPT

## 2024-10-15 PROCEDURE — G2211 COMPLEX E/M VISIT ADD ON: HCPCS | Performed by: PSYCHIATRY & NEUROLOGY

## 2024-10-15 PROCEDURE — 99215 OFFICE O/P EST HI 40 MIN: CPT | Performed by: PSYCHIATRY & NEUROLOGY

## 2024-10-15 PROCEDURE — 83090 ASSAY OF HOMOCYSTEINE: CPT

## 2024-10-15 PROCEDURE — 84443 ASSAY THYROID STIM HORMONE: CPT

## 2024-10-15 PROCEDURE — 84425 ASSAY OF VITAMIN B-1: CPT

## 2024-10-15 PROCEDURE — 83921 ORGANIC ACID SINGLE QUANT: CPT

## 2024-10-15 PROCEDURE — 84446 ASSAY OF VITAMIN E: CPT

## 2024-10-15 PROCEDURE — 80175 DRUG SCREEN QUAN LAMOTRIGINE: CPT

## 2024-10-15 PROCEDURE — 82746 ASSAY OF FOLIC ACID SERUM: CPT

## 2024-10-15 PROCEDURE — 83520 IMMUNOASSAY QUANT NOS NONAB: CPT

## 2024-10-15 PROCEDURE — 86780 TREPONEMA PALLIDUM: CPT

## 2024-10-15 PROCEDURE — 82607 VITAMIN B-12: CPT

## 2024-10-15 PROCEDURE — 36415 COLL VENOUS BLD VENIPUNCTURE: CPT

## 2024-10-15 RX ORDER — ABATACEPT 125 MG/ML
1 INJECTION, SOLUTION SUBCUTANEOUS
COMMUNITY
Start: 2024-03-15

## 2024-10-15 RX ORDER — LAMOTRIGINE 100 MG/1
100 TABLET ORAL 2 TIMES DAILY
Qty: 60 TABLET | Refills: 11 | Status: SHIPPED | OUTPATIENT
Start: 2024-10-15

## 2024-10-15 ASSESSMENT — MONTREAL COGNITIVE ASSESSMENT (MOCA)
9. REPEAT EACH SENTENCE: 2
8. SERIAL SUBTRACTION OF 7S: 2
7. [VIGILENCE] TAP WHEN HEARING DESIGNATED LETTER: 1
11. FOR EACH PAIR OF WORDS, WHAT CATEGORY DO THEY BELONG TO (OUT OF 2): 2
4. NAME EACH OF THE THREE ANIMALS SHOWN: 3
WHAT LEVEL OF EDUCATION WAS ATTAINED: 0
13. ORIENTATION SUBSCORE: 6
VISUOSPATIAL/EXECUTIVE SUBSCORE: 3
10. [FLUENCY] NAME WORDS STARTING WITH DESIGNATED LETTER: 1
WHAT IS THE TOTAL SCORE (OUT OF 30): 24
12. MEMORY INDEX SCORE: 2
6. READ LIST OF DIGITS [FORWARD/BACKWARD]: 2

## 2024-10-15 NOTE — LETTER
10/15/2024      Ivette Carmona  [address]  [place] [address]      Dear Colleague,    Thank you for referring your patient, Ivette Carmona, to the [facility] [place]. Please see a copy of my visit note below.    NEUROLOGY FOLLOW UP VISIT  NOTE       [facility] [place]  [address] [place] [address]  Tel: [phone]  Fax: [phone]  [email or web address]     Ivette Carmona,  1970, MRN [id number]  PCP: Luz Montanez  Date: 10/15/2024      ASSESSMENT & PLAN     Visit Diagnosis  SUNCT (short unilateral neuralgiform headache, conjunctival inj/tear)     Short unilateral neuralgiform headache, conjunctival injection/tear (SUNCT)  54-year-old female with history of psoriatic arthritis, gestational diabetes and history of Lyme disease with more than 1 year history of intermittent headaches that are on both sides left greater than right.  Her description sounds typical for trigeminal autonomic cephalgias(TACs) and although cluster headache is a possibility due to longer intervals and her gender I suspect she has short lasting unilateral neuralgiform headache attacks with conjunctival injections and tearing of (SUNCT).  Oxygen is helpful in aborting TACs. she was started on lamotrigine and currently gets 2 headaches in a month that respond to oxygen.  I have recommended:    1.  Increase lamotrigine to 100 mg twice daily  2.  Check lamotrigine level and hepatic panel  Previously her liver enzymes are elevated and I told her to follow-up with her primary care physician but she did not follow through on my recommendation  4.  Continue to use oxygen 8 to 12 L/min for abortive treatment in addition to Maxalt MLT as needed  5.  Follow-up in 9 months    Neurocognitive disorder  Patient reports progressive cognitive decline and she tends to struggle with short-term memory.  She scored 24/30 on MoCA.  She had a MRI of the brain in 2023  that showed no significant abnormality.  I have recommended:    1.  Lab work to include folate, HIV, homocystine, Phospho Tau 217,, MMA, RPR, TSH, B1, B12 and vitamin E  2.  Neuropsychological evaluation  3.  If above test suggested neurocognitive disorder she might need a lumbar puncture for Alzheimer's dementia evaluation  4.  Follow-up after above test      Thank you again for this referral, please feel free to contact me if you have any questions.    Abhishek Burnette MD  [facility]     HISTORY OF PRESENT ILLNESS     Patient is a 54-year-old female with history of psoriatic arthritis, gestational diabetes and history of Lyme's disease who was last seen on 4/15/2024 for 6 months history of weekly headaches that are on both sides left greater than right.  Her description sounded typical for trigeminal autonomic cephalgias(TACs) and although cluster headache is a possibility due to longer intervals and her gender I suspect she has short lasting unilateral neuralgiform headache attacks with conjunctival injections and tearing of (SUNCT).  Oxygen is helpful in aborting TACs and she was told to continue oxygen 8 to 12 L/min for abortive treatment.  I started her on lamotrigine and although it helped with her headache she misunderstood the directions and on her own tapered herself off lamotrigine.  She was told to continue lamotrigine at least for 6 months of headache free interval before we consider tapering her off.  Since her last visit she had a lab work and was noted to have elevated AST and ALT and was told to follow-up with primary care physician.  However, she completely forgot about it.  She is also reporting cognitive struggles.  Her headaches are stable.  She gets at least 2 headaches in a month that respond to oxygen.    According to patient she has been struggling with short-term memory.  She denies any visual or auditory hallucinations.  She is using a lot of cues to remember  things by Post-it notes and reminders on her smart phone.  She has some balance issues that she attributes to her psoriatic arthritis but denies any bladder incontinence.  Previous MRI brain showed age-related changes.  She has not done anything that puts her other people at risk.    BRIEFLY patient is a female with psoriatic arthritis, gestational diabetes, history of Lyme disease who developed severe unilateral headache and later part of 2023. She usually notices severe pain behind her left eye that is followed by watering of the eye and the severe throbbing pain.  This can last for 3 to 4 hours.  During 1 such episode she ended up in the ER and had MRI of the brain that was essentially normal.  There are times when she gets similar symptoms on the right side but mostly her headaches are on the left side.  Her primary physician prescribed oxygen that does help to abort the attack.  She has also used Maxalt.  Although her  smokes which is a known trigger she claims he always smokes outside     PROBLEM LIST   Patient Active Problem List   Diagnosis     Gestational diabetes     Psoriatic arthritis (H)     H/o Lyme disease     SUNCT (short unilateral neuralgiform headache, conjunctival inj/tear)         PAST MEDICAL & SURGICAL HISTORY     Past Medical History:   Patient  has a past medical history of Psoriatic arthritis (H).    Surgical History:  She  has no past surgical history on file.     SOCIAL HISTORY     Reviewed, and she  reports that she has never smoked. She has never used smokeless tobacco. She reports that she does not currently use alcohol. She reports that she does not currently use drugs.     FAMILY HISTORY     Reviewed, and family history includes Cerebrovascular Disease in her maternal grandfather; Leukemia in her maternal grandmother; Substance Abuse in her maternal half-brother.     ALLERGIES     No Known Allergies      REVIEW OF SYSTEMS     A 12 point review of system was performed and was  "negative except as outlined in the history of present illness.     HOME MEDICATIONS     Current Outpatient Rx   Medication Sig Dispense Refill     Calcium Citrate-Vitamin D 1500-200 MG-UNIT TABS Take 2 tablets by mouth daily.       folic acid (FOLVITE) 1 MG tablet Take 1,000 mcg by mouth daily       lamoTRIgine (LAMICTAL) 100 MG tablet Take 1 tablet (100 mg) by mouth 2 times daily. 60 tablet 11     levothyroxine (SYNTHROID/LEVOTHROID) 75 MCG tablet Take 75 mcg by mouth daily       methotrexate sodium 2.5 MG TABS Taking 6 tablets weekly       ORENCIA CLICKJECT 125 MG/ML SOAJ auto-injector Inject 1 mL subcutaneously.       rizatriptan (MAXALT-MLT) 10 MG ODT DISSOLVE 1 TABLET UNDER THE TONGUE AT HEADACHE ONSET MR IN 2 HRS AS NEEDED (MAX 30MG/24HRS)       zolpidem ER (AMBIEN CR) 12.5 MG CR tablet Take 12.5 mg by mouth nightly as needed for sleep           PHYSICAL EXAM     Vital signs  /67   Pulse 85   Ht 1.702 m (5' 7\")   Wt 64.8 kg (142 lb 12.8 oz)   BMI 22.37 kg/m      Weight:   142 lbs 12.8 oz  Lecompte Cognitive Assessment:    Lecompte Cognitive Assessment (MOCA)  Visuospatial/Executive : 3  Naming: 3  Attention - Digits: 2  Attention - Letters: 1  Attention - Subtraction: 2  Language - Repeat: 2  Language - Fluency : 1  Abstraction: 2  Delayed Recall: 2  Orientation: 6  Education: 0  MOCA Score: 24  Administered by: : Triny Lubin CMA     David Cognitive Assessment Score:  MOCA Score: 24/30.    Patient is alert and oriented x4 in no acute distress. Vital signs were reviewed and are documented in electronic medical record. Neck was supple, no carotid bruits, thyromegaly, JVD, or lymphadenopathy was noted.   NEUROLOGY EXAM:    Patient s speech was normal with no aphasia or dysarthria. Mentation, and affect were also normal.     Funduscopic exam was normal, with normal cup to disc ratio. Cranial nerves II -XII were intact.     Patient had normal mass, tone and motor strength was 5/5 in all extremities " without pronator drift.     Sensation was intact to light touch, pinprick, and vibratory sensation.     Reflexes were 1+ symmetrical with downgoing toes.     No dysmetria noted on FNF or HKS. Romberg was negative.    Gait testing was normal. Able to walk on toes/heels. Tandem walk normal.     PERTINENT DIAGNOSTIC STUDIES     Following studies were reviewed:     MRI BRAIN 9/18/2023  1.  Unremarkable brain MRI. No acute infarct, mass, or hemorrhage.  2.  Slight focal asymmetric dilation along the posterior-superior margin of the right lateral ventricle with mild surrounding T2 FLAIR hyperintensity. This could reflect remote injury or developmental variation.     PERTINENT LABS  Following labs were reviewed:  No visits with results within 3 Month(s) from this visit.   Latest known visit with results is:   Lab on 04/15/2024   Component Date Value Ref Range Status     Protein Total 04/15/2024 7.0  6.4 - 8.3 g/dL Final     Albumin 04/15/2024 4.3  3.5 - 5.2 g/dL Final     Bilirubin Total 04/15/2024 0.4  <=1.2 mg/dL Final     Alkaline Phosphatase 04/15/2024 109  40 - 150 U/L Final     AST 04/15/2024 71 (H)  0 - 45 U/L Final     ALT 04/15/2024 202 (H)  0 - 50 U/L Final     Bilirubin Direct 04/15/2024 <0.20  0.00 - 0.30 mg/dL Final         Total time spent for face to face visit, reviewing labs/imaging studies, counseling and coordination of care was: 45 Minutes spent on the date of the encounter doing chart review, review of outside records, review of test results, interpretation of tests, patient visit, and documentation     The longitudinal plan of care for the diagnosis(es)/condition(s) as documented were addressed during this visit. Due to the added complexity in care, I will continue to support Pankaj in the subsequent management and with ongoing continuity of care.    This note was dictated using voice recognition software.  Any grammatical or context distortions are unintentional and inherent to the software.    Orders  Placed This Encounter   Procedures     Folate     Methylmalonic Acid     Homocysteine     [facility]; ; PHOSPHO , Plasma (Laboratory Miscellaneous Order)     Treponema Abs w Reflex to RPR and Titer     TSH with free T4 reflex     Vitamin B1 whole blood     Vitamin B12     Vitamin E     HIV Antigen Antibody Combo Cascade     Lamotrigine Level     Hepatic function panel     Adult Neuropsychology  Referral      New Prescriptions    No medications on file     Modified Medications    Modified Medication Previous Medication    LAMOTRIGINE (LAMICTAL) 100 MG TABLET lamoTRIgine (LAMICTAL) 25 MG tablet       Take 1 tablet (100 mg) by mouth 2 times daily.    Take 2 tablets (50 mg) by mouth 2 times daily                 Again, thank you for allowing me to participate in the care of your patient.        Sincerely,        Abhishek Burnette MD

## 2024-10-15 NOTE — PATIENT INSTRUCTIONS
Once your Neuropsychology testing has been scheduled, please contact us by phone at [phone] or through Tailored (preferred) with the date of your appointment. We will then assist you with scheduling the follow up appointment with your provider.   (The follow up appointment should be about 2-4 weeks after the testing)      Neuropsychological Testing  Frequently Asked Questions      What is Neuropsychological Testing?  Neuropsychological testing involves talking with a doctor and participating in some tests and activities that give your providers more information about how your brain is functioning.  The testing will help clarify if you have any difficulties with your thinking, what might be causing those changes, and what we can do to help.      Why would I be referred for neuropsychological testing?  Testing is requested for many different reasons, typically after concerns have been raised about memory or other thinking abilities (e.g., attention, language, problem solving) or if you have a condition/injury known to be associated with changes in thinking. It can also be requested if a baseline is needed prior to undergoing surgery or other intervention. It can also help to identify your areas of strength, or provide reassurance that everything is normal. The testing often helps your providers know how to better serve you and provide proper treatment if needed.    What will happen at my appointment?  The appointment is scheduled to be up to 4 hours long. This includes a 30-60 minute conversation with a provider, followed by about 2-3 hours of one-on-one testing. We will assess various thinking abilities, such as attention/concentration, thinking speed, language, visual/spatial skills, learning and memory, and other complex skills.  These are assessed through question-and-answer activities, puzzles and games, and occasionally with pencil and paper (drawing, etc.). You may also be asked to fill out short  questionnaires about your mood and/or activities.     What should I do to prepare?   There is no way to study or prepare for this appointment; simply arrive and try your best! Getting a good night's sleep the night before can also be helpful. You can take breaks during the testing whenever you need to, and you are welcome to bring snacks and beverages. We also encourage you to bring someone who knows you well for the interview portion so that we can hear their perspective about how you are doing in daily life. (They do not need to stay while you complete the testing portion of the appointment.)     When and how do I get my results?  Test results will not be available that day. Typically, the provider will have you return for a (much shorter) 30-60 minute appointment to discuss the results in detail, about two weeks after the testing is completed. A copy of the test results will also get sent back to your referring provider.     We look forward to seeing you!    Thank you!

## 2024-10-15 NOTE — NURSING NOTE
KARO COGNITIVE ASSESSMENT (MOCA)  Version 7.1 Original Version  VISUOSPATIAL/EXECUTIVE               COPY CUBE      [   0]                                [ 0   ] DRAW CLOCK (Ten past eleven)  (3 points)    [  1  ]                    [  1  ]               [ 1   ]       Contour            Numbers     Hands POINTS                3   / 5   NAMING    [  1 ]                                                                        [    1]                                             [   1 ]  Licynthia Lay                                Camel                3     / 3   MEMORY Read list of words, subject must repeat them. Do 2 trials, even if 1st trial is successful. Do a recall after 5 minutes  FACE VELVET Hoahaoism ASHLEY RED No Points    1st x x x x x     2nd x X  x x x    ATTENTION Read list of digits (1 digit/sec) Subject has to repeat in the forward order       [  1  ]   2  1  8  5  4                                [  1  ] 7 4 2                   2       /2   Read list of letters. The subject must tap with his hand at each letter A. No points if > 2 errors.  [   1 ] F B A C M N A A J K L B A F A K D E A A A J A M O F A A B          1    /1   Serial 7 subtraction starting at 100          [  1  ] 93         [  1  ] 86          [    ] 79          [    ] 72         [    ] 65   4 or 5 correct subtractions: 3 points,  2 or 3 correct: 2 points,  1correct: 1 point,   0 correct: 0 points         2   /3   LANGUAGE Repeat: I only know that Shayan is the one to help today. [  1   ]                                      The cat always hid under the couch when dogs were in the room. [ 1  ]            2   /2   Fluency: Name maximum number of words in one minute that begin with the letter F                                                                                                                    [  1  ] _11__ (N > 11 words)          1 /1   ABSTRACTION  Similarity between e.g. banana-orange=fruit                                                                   [  1  ] train-bicycle                      [  1  ] watch-ruler             2 /2   DELAYED  RECALL Has to recall words  WITH NO CUE FACE  [  0  ] VELVET  [ 0   ] Bahai  [  1  ]  ASHLEY  [  0  ] RED  [   1 ] Points for UNCUED recall only       2     /5           OPTIONAL Category cue           Multiple choice cue          ORIENTATION  [ 1   ] Date     [ 1   ] Month       [  1  ] Year      [ 1   ] Day      [  1  ] Place        [   1 ] City     6 /6   TOTAL  Normal > 26/30 Add 1 point if < 12 years education     24 /30

## 2024-10-15 NOTE — NURSING NOTE
Chief Complaint   Patient presents with    Headache     Patient states she is having 2 headaches per month. Follow up     Keri Moya on 10/15/2024 at 9:59 AM

## 2024-10-16 LAB — MAYO MISC RESULT: NORMAL

## 2024-10-17 LAB
LAMOTRIGINE SERPL-MCNC: 3.1 UG/ML
METHYLMALONATE SERPL-SCNC: 0.22 UMOL/L (ref 0–0.4)

## 2024-10-18 LAB
A-TOCOPHEROL VIT E SERPL-MCNC: 16.6 MG/L
BETA+GAMMA TOCOPHEROL SERPL-MCNC: 1.3 MG/L
VIT B1 PYROPHOSHATE BLD-SCNC: 155 NMOL/L

## 2024-10-28 DIAGNOSIS — R41.9 NEUROCOGNITIVE DISORDER: Primary | ICD-10-CM

## 2024-11-11 ENCOUNTER — MYC MEDICAL ADVICE (OUTPATIENT)
Dept: NEUROLOGY | Facility: CLINIC | Age: 54
End: 2024-11-11
Payer: COMMERCIAL

## 2025-04-11 NOTE — PROGRESS NOTES
NEUROLOGY FOLLOW UP VISIT  NOTE       [facility] NEUROLOGY [place]  [address] [place] [address]  Tel: [phone]  Fax: [phone]  [email or web address]     Ivette Carmona,  1970, MRN [id number]  PCP: Luz Montanez  Date: 2025      ASSESSMENT & PLAN     Visit Diagnosis  SUNCT (short unilateral neuralgiform headache, conjunctival inj/tear)     SUNCT (short unilateral neuralgiform headache, conjunctival inj/tear)  54-year-old female with history of psoriatic arthritis, gestational diabetes, history of Lyme disease who was evaluated for more than 1 year history of intermittent headaches that were on both sides left greater than right.  She was diagnosed with SUNCT (short unilateral neuralgiform headache, conjunctival inj/tear) and started on lamotrigine with gradual improvement in her symptoms.  For abortive treatment she is using oxygen in addition to rizatriptan.  She is quite pleased with the results and does not want any change in her medication.  I have recommended:    1.  Continue lamotrigine 100 mg twice daily.  Prescriptions were filled for next year  2.  Check hepatic panel  3.  Continue to use oxygen 8 to 12 L/min for abortive treatment in addition to Maxalt-MLT  4.  Previously she had complained of cognitive decline but lab work including  and neuropsychological testing were normal and her cognitive struggles were felt to be due to underlying depression  5.  Follow-up in 1 year    Thank you again for this referral, please feel free to contact me if you have any questions.    Abhishek Burnette MD  [facility] NEUROLOGY, [place]     HISTORY OF PRESENT ILLNESS     Patient is 54-year-old female with psoriatic arthritis, gestational diabetes, history of Lyme's disease last evaluated on 10/15/2024 for more than 1 year history of  intermittent headaches that are on both sides left greater than right.  Her description sounded typical for trigeminal  autonomic cephalgias(TACs) and although cluster headache was a possibility due to longer intervals and her gender I suspected she had short lasting unilateral neuralgiform headache attacks with conjunctival injections and tearing of (SUNCT).  Oxygen is helpful in aborting TACs. she was started on lamotrigine and currently gets 2 headaches in a month that respond to oxygen.  During her last visit she was told to increase the dose of lamotrigine to 100 mg twice daily and to use oxygen 8 to 12 L/min for abortive treatment in addition to Maxalt MLT.  She was noted to have elevated liver enzymes and was told to follow-up with her primary care physician.  However, she does not recall seeing her primary care physician and thinks her rheumatologist checked her hepatic panel and was normal.  Lamotrigine level was checked and was normal.  She feels her symptoms are very well-controlled and is not interested in making any changes.    Patient also complained of cognitive decline and had lab work for common causes of cognitive decline that included normal folate, MMA, homocystine, RPR, TSH, B1, B12, alpha-tocopherol, HIV and .  Neuropsychological testing was unremarkable with no evidence of neurocognitive disorder.  Her symptoms were felt to be due to depression    BRIEFLY patient is a female with psoriatic arthritis, gestational diabetes, history of Lyme disease who developed severe unilateral headache and later part of 2023. She usually notices severe pain behind her left eye that is followed by watering of the eye and the severe throbbing pain.  This can last for 3 to 4 hours.  During 1 such episode she ended up in the ER and had MRI of the brain that was essentially normal.  There are times when she gets similar symptoms on the right side but mostly her headaches are on the left side.  Her primary physician prescribed oxygen that does help to abort the attack.  She has also used Maxalt.  Although her  smokes  which is a known trigger she claims he always smokes outside.    She also complained of neurocognitive decline and scored 20/30 on MoCA but workup including neuropsychological testing and lab work including  was normal     PROBLEM LIST   Patient Active Problem List   Diagnosis    Gestational diabetes    Psoriatic arthritis (H)    H/o Lyme disease    SUNCT (short unilateral neuralgiform headache, conjunctival inj/tear)         PAST MEDICAL & SURGICAL HISTORY     Past Medical History:   Patient  has a past medical history of Psoriatic arthritis (H).    Surgical History:  She  has no past surgical history on file.     SOCIAL HISTORY     Reviewed, and she  reports that she has never smoked. She has never used smokeless tobacco. She reports that she does not currently use alcohol. She reports that she does not currently use drugs.     FAMILY HISTORY     Reviewed, and family history includes Cerebrovascular Disease in her maternal grandfather; Leukemia in her maternal grandmother; Substance Abuse in her maternal half-brother.     ALLERGIES     No Known Allergies      REVIEW OF SYSTEMS     A 12 point review of system was performed and was negative except as outlined in the history of present illness.     HOME MEDICATIONS     Current Outpatient Rx   Medication Sig Dispense Refill    Calcium Citrate-Vitamin D 1500-200 MG-UNIT TABS Take 2 tablets by mouth daily.      folic acid (FOLVITE) 1 MG tablet Take 1,000 mcg by mouth daily      lamoTRIgine (LAMICTAL) 100 MG tablet Take 1 tablet (100 mg) by mouth 2 times daily. 180 tablet 3    levothyroxine (SYNTHROID/LEVOTHROID) 75 MCG tablet Take 75 mcg by mouth daily      methotrexate sodium 2.5 MG TABS Taking 6 tablets weekly      ORENCIA CLICKJECT 125 MG/ML SOAJ auto-injector Inject 1 mL subcutaneously.      rizatriptan (MAXALT-MLT) 10 MG ODT Take 1 tablet (10 mg) by mouth at onset of headache for migraine (May repeat Q2 Hour. Maximun 3 tablets in 24 hours). 12 tablet 11     zolpidem ER (AMBIEN CR) 12.5 MG CR tablet Take 12.5 mg by mouth nightly as needed for sleep           PHYSICAL EXAM     Vital signs  /73 (BP Location: Left arm, Patient Position: Sitting)   Pulse 87     Weight:   0 lbs 0 oz    Patient is alert and oriented x4 in no acute distress. Vital signs were reviewed and are documented in electronic medical record. Neck was supple, no carotid bruits, thyromegaly, JVD, or lymphadenopathy was noted.   NEUROLOGY EXAM:   Patient s speech was normal with no aphasia or dysarthria. Mentation, and affect were also normal.    Funduscopic exam was normal, with normal cup to disc ratio. Cranial nerves II -XII were intact.    Patient had normal mass, tone and motor strength was 5/5 in all extremities without pronator drift.    Sensation was intact to light touch, pinprick, and vibratory sensation.    Reflexes were 1+ symmetrical with downgoing toes.    No dysmetria noted on FNF or HKS. Romberg was negative.   Gait testing was normal. Able to walk on toes/heels. Tandem walk normal.      PERTINENT DIAGNOSTIC STUDIES     Following studies were reviewed:     MRI BRAIN 9/18/2023  1.  Unremarkable brain MRI. No acute infarct, mass, or hemorrhage.  2.  Slight focal asymmetric dilation along the posterior-superior margin of the right lateral ventricle with mild surrounding T2 FLAIR hyperintensity. This could reflect remote injury or developmental variation.    NEUROPSYCHOLOGICAL EVALUATION 1/13/2025         PERTINENT LABS  Following labs were reviewed:  No visits with results within 3 Month(s) from this visit.   Latest known visit with results is:   Lab on 10/15/2024   Component Date Value Ref Range Status    Folic Acid 10/15/2024 >40.0 (H)  4.6 - 34.8 ng/mL Final    Methylmalonic Acid 10/15/2024 0.22  0.00 - 0.40 umol/L Final    Homocysteine 10/15/2024 11.7  0.0 - 15.0 umol/L Final    Specimen Status 10/15/2024 Specimen received. Reordered and sent to performing laboratory. Report to  follow upon completion.   Final    Performing Laboratory 10/15/2024 [facility]   Final    Test Name 10/15/2024 PHOSPHO , Plasma   Final    Test Code 10/15/2024    Final    Treponema Antibody Total 10/15/2024 Nonreactive  Nonreactive Final    TSH 10/15/2024 0.78  0.30 - 4.20 uIU/mL Final    Vitamin B1 Whole Blood Level 10/15/2024 155  70 - 180 nmol/L Final    Vitamin B12 10/15/2024 582  232 - 1,245 pg/mL Final    Vitamin E 10/15/2024 16.6  5.5 - 18.0 mg/L Final    Vitamin E Gamma 10/15/2024 1.3  0.0 - 6.0 mg/L Final    HIV Antigen Antibody Combo 10/15/2024 Nonreactive  Nonreactive Final    Lamotrigine 10/15/2024 3.1  3.0 - 15.0 ug/mL Final    Protein Total 10/15/2024 7.3  6.4 - 8.3 g/dL Final    Albumin 10/15/2024 4.3  3.5 - 5.2 g/dL Final    Bilirubin Total 10/15/2024 0.4  <=1.2 mg/dL Final    Alkaline Phosphatase 10/15/2024 151 (H)  40 - 150 U/L Final    AST 10/15/2024 67 (H)  0 - 45 U/L Final    ALT 10/15/2024 84 (H)  0 - 50 U/L Final    Bilirubin Direct 10/15/2024 <0.20  0.00 - 0.30 mg/dL Final    Mabry Result 10/15/2024 SEE NOTE   Final         Total time spent for face to face visit, reviewing labs/imaging studies, counseling and coordination of care was: 30 Minutes spent on the date of the encounter doing chart review, review of outside records, review of test results, interpretation of tests, patient visit, and documentation     The longitudinal plan of care for the diagnosis(es)/condition(s) as documented were addressed during this visit. Due to the added complexity in care, I will continue to support Pankaj in the subsequent management and with ongoing continuity of care.    This note was dictated using voice recognition software.  Any grammatical or context distortions are unintentional and inherent to the software.    Orders Placed This Encounter   Procedures    Lamotrigine Level    Hepatic function panel      New Prescriptions    No medications on file     Modified Medications     Modified Medication Previous Medication    LAMOTRIGINE (LAMICTAL) 100 MG TABLET lamoTRIgine (LAMICTAL) 100 MG tablet       Take 1 tablet (100 mg) by mouth 2 times daily.    Take 1 tablet (100 mg) by mouth 2 times daily.    RIZATRIPTAN (MAXALT-MLT) 10 MG ODT rizatriptan (MAXALT-MLT) 10 MG ODT       Take 1 tablet (10 mg) by mouth at onset of headache for migraine (May repeat Q2 Hour. Maximun 3 tablets in 24 hours).    DISSOLVE 1 TABLET UNDER THE TONGUE AT HEADACHE ONSET MR IN 2 HRS AS NEEDED (MAX 30MG/24HRS)

## 2025-04-21 ENCOUNTER — OFFICE VISIT (OUTPATIENT)
Dept: NEUROLOGY | Facility: CLINIC | Age: 55
End: 2025-04-21
Payer: COMMERCIAL

## 2025-04-21 ENCOUNTER — LAB (OUTPATIENT)
Dept: LAB | Facility: HOSPITAL | Age: 55
End: 2025-04-21
Payer: COMMERCIAL

## 2025-04-21 VITALS — SYSTOLIC BLOOD PRESSURE: 116 MMHG | DIASTOLIC BLOOD PRESSURE: 73 MMHG | HEART RATE: 87 BPM

## 2025-04-21 DIAGNOSIS — G44.059 SUNCT (SHORT UNILATERAL NEURALGIFORM HEADACHE, CONJUNCTIVAL INJ/TEAR): Primary | ICD-10-CM

## 2025-04-21 DIAGNOSIS — R41.9 NEUROCOGNITIVE DISORDER: ICD-10-CM

## 2025-04-21 DIAGNOSIS — G44.059 SUNCT (SHORT UNILATERAL NEURALGIFORM HEADACHE, CONJUNCTIVAL INJ/TEAR): ICD-10-CM

## 2025-04-21 LAB
ALBUMIN SERPL BCG-MCNC: 4.5 G/DL (ref 3.5–5.2)
ALP SERPL-CCNC: 145 U/L (ref 40–150)
ALT SERPL W P-5'-P-CCNC: 26 U/L (ref 0–50)
AST SERPL W P-5'-P-CCNC: 22 U/L (ref 0–45)
BILIRUB DIRECT SERPL-MCNC: 0.15 MG/DL (ref 0–0.3)
BILIRUB SERPL-MCNC: 0.5 MG/DL
PROT SERPL-MCNC: 7.3 G/DL (ref 6.4–8.3)

## 2025-04-21 PROCEDURE — 3074F SYST BP LT 130 MM HG: CPT | Performed by: PSYCHIATRY & NEUROLOGY

## 2025-04-21 PROCEDURE — 82040 ASSAY OF SERUM ALBUMIN: CPT

## 2025-04-21 PROCEDURE — 36415 COLL VENOUS BLD VENIPUNCTURE: CPT

## 2025-04-21 PROCEDURE — G2211 COMPLEX E/M VISIT ADD ON: HCPCS | Performed by: PSYCHIATRY & NEUROLOGY

## 2025-04-21 PROCEDURE — 99214 OFFICE O/P EST MOD 30 MIN: CPT | Performed by: PSYCHIATRY & NEUROLOGY

## 2025-04-21 PROCEDURE — 3078F DIAST BP <80 MM HG: CPT | Performed by: PSYCHIATRY & NEUROLOGY

## 2025-04-21 PROCEDURE — 80175 DRUG SCREEN QUAN LAMOTRIGINE: CPT

## 2025-04-21 RX ORDER — RIZATRIPTAN BENZOATE 10 MG/1
10 TABLET, ORALLY DISINTEGRATING ORAL
Qty: 12 TABLET | Refills: 11 | Status: SHIPPED | OUTPATIENT
Start: 2025-04-21

## 2025-04-21 RX ORDER — LAMOTRIGINE 100 MG/1
100 TABLET ORAL 2 TIMES DAILY
Qty: 180 TABLET | Refills: 3 | Status: SHIPPED | OUTPATIENT
Start: 2025-04-21

## 2025-04-21 NOTE — LETTER
2025      Ivette Carmona  [address]  [place] [address]      Dear Colleague,    Thank you for referring your patient, Ivette Carmona, to the [facility] NEUROLOGY [facility] [place]. Please see a copy of my visit note below.    NEUROLOGY FOLLOW UP VISIT  NOTE       [facility] NEUROLOGY [place]  [address] [place] [address]  Tel: [phone]  Fax: [phone]  [email or web address]     Ivette Carmona,  1970, MRN [id number]  PCP: Luz Montanez  Date: 2025      ASSESSMENT & PLAN     Visit Diagnosis  SUNCT (short unilateral neuralgiform headache, conjunctival inj/tear)     SUNCT (short unilateral neuralgiform headache, conjunctival inj/tear)  54-year-old female with history of psoriatic arthritis, gestational diabetes, history of Lyme disease who was evaluated for more than 1 year history of intermittent headaches that were on both sides left greater than right.  She was diagnosed with SUNCT (short unilateral neuralgiform headache, conjunctival inj/tear) and started on lamotrigine with gradual improvement in her symptoms.  For abortive treatment she is using oxygen in addition to rizatriptan.  She is quite pleased with the results and does not want any change in her medication.  I have recommended:    1.  Continue lamotrigine 100 mg twice daily.  Prescriptions were filled for next year  2.  Check hepatic panel  3.  Continue to use oxygen 8 to 12 L/min for abortive treatment in addition to Maxalt-MLT  4.  Previously she had complained of cognitive decline but lab work including  and neuropsychological testing were normal and her cognitive struggles were felt to be due to underlying depression  5.  Follow-up in 1 year    Thank you again for this referral, please feel free to contact me if you have any questions.    Abhishek Burnette MD  [facility] NEUROLOGY, [place]     HISTORY OF PRESENT ILLNESS     Patient is 54-year-old female with psoriatic  arthritis, gestational diabetes, history of Lyme's disease last evaluated on 10/15/2024 for more than 1 year history of  intermittent headaches that are on both sides left greater than right.  Her description sounded typical for trigeminal autonomic cephalgias(TACs) and although cluster headache was a possibility due to longer intervals and her gender I suspected she had short lasting unilateral neuralgiform headache attacks with conjunctival injections and tearing of (SUNCT).  Oxygen is helpful in aborting TACs. she was started on lamotrigine and currently gets 2 headaches in a month that respond to oxygen.  During her last visit she was told to increase the dose of lamotrigine to 100 mg twice daily and to use oxygen 8 to 12 L/min for abortive treatment in addition to Maxalt MLT.  She was noted to have elevated liver enzymes and was told to follow-up with her primary care physician.  However, she does not recall seeing her primary care physician and thinks her rheumatologist checked her hepatic panel and was normal.  Lamotrigine level was checked and was normal.  She feels her symptoms are very well-controlled and is not interested in making any changes.    Patient also complained of cognitive decline and had lab work for common causes of cognitive decline that included normal folate, MMA, homocystine, RPR, TSH, B1, B12, alpha-tocopherol, HIV and .  Neuropsychological testing was unremarkable with no evidence of neurocognitive disorder.  Her symptoms were felt to be due to depression    BRIEFLY patient is a female with psoriatic arthritis, gestational diabetes, history of Lyme disease who developed severe unilateral headache and later part of 2023. She usually notices severe pain behind her left eye that is followed by watering of the eye and the severe throbbing pain.  This can last for 3 to 4 hours.  During 1 such episode she ended up in the ER and had MRI of the brain that was essentially normal.  There  are times when she gets similar symptoms on the right side but mostly her headaches are on the left side.  Her primary physician prescribed oxygen that does help to abort the attack.  She has also used Maxalt.  Although her  smokes which is a known trigger she claims he always smokes outside.    She also complained of neurocognitive decline and scored 20/30 on MoCA but workup including neuropsychological testing and lab work including  was normal     PROBLEM LIST   Patient Active Problem List   Diagnosis     Gestational diabetes     Psoriatic arthritis (H)     H/o Lyme disease     SUNCT (short unilateral neuralgiform headache, conjunctival inj/tear)         PAST MEDICAL & SURGICAL HISTORY     Past Medical History:   Patient  has a past medical history of Psoriatic arthritis (H).    Surgical History:  She  has no past surgical history on file.     SOCIAL HISTORY     Reviewed, and she  reports that she has never smoked. She has never used smokeless tobacco. She reports that she does not currently use alcohol. She reports that she does not currently use drugs.     FAMILY HISTORY     Reviewed, and family history includes Cerebrovascular Disease in her maternal grandfather; Leukemia in her maternal grandmother; Substance Abuse in her maternal half-brother.     ALLERGIES     No Known Allergies      REVIEW OF SYSTEMS     A 12 point review of system was performed and was negative except as outlined in the history of present illness.     HOME MEDICATIONS     Current Outpatient Rx   Medication Sig Dispense Refill     Calcium Citrate-Vitamin D 1500-200 MG-UNIT TABS Take 2 tablets by mouth daily.       folic acid (FOLVITE) 1 MG tablet Take 1,000 mcg by mouth daily       lamoTRIgine (LAMICTAL) 100 MG tablet Take 1 tablet (100 mg) by mouth 2 times daily. 180 tablet 3     levothyroxine (SYNTHROID/LEVOTHROID) 75 MCG tablet Take 75 mcg by mouth daily       methotrexate sodium 2.5 MG TABS Taking 6 tablets weekly        ORENCIA CLICKJECT 125 MG/ML SOAJ auto-injector Inject 1 mL subcutaneously.       rizatriptan (MAXALT-MLT) 10 MG ODT Take 1 tablet (10 mg) by mouth at onset of headache for migraine (May repeat Q2 Hour. Maximun 3 tablets in 24 hours). 12 tablet 11     zolpidem ER (AMBIEN CR) 12.5 MG CR tablet Take 12.5 mg by mouth nightly as needed for sleep           PHYSICAL EXAM     Vital signs  /73 (BP Location: Left arm, Patient Position: Sitting)   Pulse 87     Weight:   0 lbs 0 oz    Patient is alert and oriented x4 in no acute distress. Vital signs were reviewed and are documented in electronic medical record. Neck was supple, no carotid bruits, thyromegaly, JVD, or lymphadenopathy was noted.   NEUROLOGY EXAM:    Patient s speech was normal with no aphasia or dysarthria. Mentation, and affect were also normal.     Funduscopic exam was normal, with normal cup to disc ratio. Cranial nerves II -XII were intact.     Patient had normal mass, tone and motor strength was 5/5 in all extremities without pronator drift.     Sensation was intact to light touch, pinprick, and vibratory sensation.     Reflexes were 1+ symmetrical with downgoing toes.     No dysmetria noted on FNF or HKS. Romberg was negative.    Gait testing was normal. Able to walk on toes/heels. Tandem walk normal.      PERTINENT DIAGNOSTIC STUDIES     Following studies were reviewed:     MRI BRAIN 9/18/2023  1.  Unremarkable brain MRI. No acute infarct, mass, or hemorrhage.  2.  Slight focal asymmetric dilation along the posterior-superior margin of the right lateral ventricle with mild surrounding T2 FLAIR hyperintensity. This could reflect remote injury or developmental variation.    NEUROPSYCHOLOGICAL EVALUATION 1/13/2025         PERTINENT LABS  Following labs were reviewed:  No visits with results within 3 Month(s) from this visit.   Latest known visit with results is:   Lab on 10/15/2024   Component Date Value Ref Range Status     Folic Acid 10/15/2024  >40.0 (H)  4.6 - 34.8 ng/mL Final     Methylmalonic Acid 10/15/2024 0.22  0.00 - 0.40 umol/L Final     Homocysteine 10/15/2024 11.7  0.0 - 15.0 umol/L Final     Specimen Status 10/15/2024 Specimen received. Reordered and sent to performing laboratory. Report to follow upon completion.   Final     Performing Laboratory 10/15/2024 [facility]   Final     Test Name 10/15/2024 PHOSPHO , Plasma   Final     Test Code 10/15/2024    Final     Treponema Antibody Total 10/15/2024 Nonreactive  Nonreactive Final     TSH 10/15/2024 0.78  0.30 - 4.20 uIU/mL Final     Vitamin B1 Whole Blood Level 10/15/2024 155  70 - 180 nmol/L Final     Vitamin B12 10/15/2024 582  232 - 1,245 pg/mL Final     Vitamin E 10/15/2024 16.6  5.5 - 18.0 mg/L Final     Vitamin E Gamma 10/15/2024 1.3  0.0 - 6.0 mg/L Final     HIV Antigen Antibody Combo 10/15/2024 Nonreactive  Nonreactive Final     Lamotrigine 10/15/2024 3.1  3.0 - 15.0 ug/mL Final     Protein Total 10/15/2024 7.3  6.4 - 8.3 g/dL Final     Albumin 10/15/2024 4.3  3.5 - 5.2 g/dL Final     Bilirubin Total 10/15/2024 0.4  <=1.2 mg/dL Final     Alkaline Phosphatase 10/15/2024 151 (H)  40 - 150 U/L Final     AST 10/15/2024 67 (H)  0 - 45 U/L Final     ALT 10/15/2024 84 (H)  0 - 50 U/L Final     Bilirubin Direct 10/15/2024 <0.20  0.00 - 0.30 mg/dL Final     Mabry Result 10/15/2024 SEE NOTE   Final         Total time spent for face to face visit, reviewing labs/imaging studies, counseling and coordination of care was: 30 Minutes spent on the date of the encounter doing chart review, review of outside records, review of test results, interpretation of tests, patient visit, and documentation     The longitudinal plan of care for the diagnosis(es)/condition(s) as documented were addressed during this visit. Due to the added complexity in care, I will continue to support Pankaj in the subsequent management and with ongoing continuity of care.    This note was dictated using  voice recognition software.  Any grammatical or context distortions are unintentional and inherent to the software.    Orders Placed This Encounter   Procedures     Lamotrigine Level     Hepatic function panel      New Prescriptions    No medications on file     Modified Medications    Modified Medication Previous Medication    LAMOTRIGINE (LAMICTAL) 100 MG TABLET lamoTRIgine (LAMICTAL) 100 MG tablet       Take 1 tablet (100 mg) by mouth 2 times daily.    Take 1 tablet (100 mg) by mouth 2 times daily.    RIZATRIPTAN (MAXALT-MLT) 10 MG ODT rizatriptan (MAXALT-MLT) 10 MG ODT       Take 1 tablet (10 mg) by mouth at onset of headache for migraine (May repeat Q2 Hour. Maximun 3 tablets in 24 hours).    DISSOLVE 1 TABLET UNDER THE TONGUE AT HEADACHE ONSET MR IN 2 HRS AS NEEDED (MAX 30MG/24HRS)                 Again, thank you for allowing me to participate in the care of your patient.        Sincerely,        Abhishek Burnette MD    Electronically signed

## 2025-04-21 NOTE — NURSING NOTE
Chief Complaint   Patient presents with    Follow Up     Return     Natali Ornelas MA on 4/21/2025 at 9:47 AM

## 2025-04-22 LAB — LAMOTRIGINE SERPL-MCNC: 5.1 UG/ML

## 2025-06-22 ENCOUNTER — HEALTH MAINTENANCE LETTER (OUTPATIENT)
Age: 55
End: 2025-06-22

## 2025-07-13 ENCOUNTER — HEALTH MAINTENANCE LETTER (OUTPATIENT)
Age: 55
End: 2025-07-13